# Patient Record
Sex: FEMALE | Race: WHITE | NOT HISPANIC OR LATINO | Employment: OTHER | URBAN - METROPOLITAN AREA
[De-identification: names, ages, dates, MRNs, and addresses within clinical notes are randomized per-mention and may not be internally consistent; named-entity substitution may affect disease eponyms.]

---

## 2017-02-27 ENCOUNTER — NON-PROVIDER VISIT (OUTPATIENT)
Dept: URGENT CARE | Facility: CLINIC | Age: 67
End: 2017-02-27

## 2017-02-27 DIAGNOSIS — Z02.89 ENCOUNTER FOR OCCUPATIONAL HEALTH EXAMINATION: ICD-10-CM

## 2017-02-27 DIAGNOSIS — Z02.1 PRE-EMPLOYMENT DRUG SCREENING: ICD-10-CM

## 2017-02-27 LAB
AMP AMPHETAMINE: NORMAL
COC COCAINE: NORMAL
INT CON NEG: NORMAL
INT CON POS: NORMAL
MET METHAMPHETAMINES: NORMAL
OPI OPIATES: NORMAL
PCP PHENCYCLIDINE: NORMAL
POC DRUG COMMENT 753798-OCCUPATIONAL HEALTH: NEGATIVE
THC: NORMAL

## 2017-02-27 PROCEDURE — 80305 DRUG TEST PRSMV DIR OPT OBS: CPT | Performed by: NURSE PRACTITIONER

## 2018-06-14 ENCOUNTER — HOSPITAL ENCOUNTER (EMERGENCY)
Facility: MEDICAL CENTER | Age: 68
End: 2018-06-14
Attending: EMERGENCY MEDICINE
Payer: MEDICARE

## 2018-06-14 ENCOUNTER — APPOINTMENT (OUTPATIENT)
Dept: RADIOLOGY | Facility: MEDICAL CENTER | Age: 68
End: 2018-06-14
Attending: EMERGENCY MEDICINE
Payer: MEDICARE

## 2018-06-14 VITALS
BODY MASS INDEX: 22.42 KG/M2 | HEIGHT: 60 IN | SYSTOLIC BLOOD PRESSURE: 128 MMHG | OXYGEN SATURATION: 97 % | TEMPERATURE: 98.1 F | HEART RATE: 76 BPM | DIASTOLIC BLOOD PRESSURE: 68 MMHG | WEIGHT: 114.2 LBS | RESPIRATION RATE: 18 BRPM

## 2018-06-14 DIAGNOSIS — S50.02XA CONTUSION OF LEFT ELBOW, INITIAL ENCOUNTER: ICD-10-CM
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
## 2018-06-14 DIAGNOSIS — M24.562 KNEE CONTRACTURE, LEFT: ICD-10-CM

## 2018-06-14 PROCEDURE — 73080 X-RAY EXAM OF ELBOW: CPT | Mod: LT

## 2018-06-14 PROCEDURE — A9270 NON-COVERED ITEM OR SERVICE: HCPCS | Performed by: EMERGENCY MEDICINE

## 2018-06-14 PROCEDURE — 73564 X-RAY EXAM KNEE 4 OR MORE: CPT | Mod: LT

## 2018-06-14 PROCEDURE — 99284 EMERGENCY DEPT VISIT MOD MDM: CPT

## 2018-06-14 PROCEDURE — 700102 HCHG RX REV CODE 250 W/ 637 OVERRIDE(OP): Performed by: EMERGENCY MEDICINE

## 2018-06-14 RX ORDER — IBUPROFEN 600 MG/1
600 TABLET ORAL ONCE
Status: COMPLETED | OUTPATIENT
Start: 2018-06-14 | End: 2018-06-14

## 2018-06-14 RX ORDER — MELOXICAM 7.5 MG/1
7.5 TABLET ORAL DAILY
Qty: 30 TAB | Refills: 0 | Status: ON HOLD | OUTPATIENT
Start: 2018-06-14 | End: 2020-07-17

## 2018-06-14 RX ADMIN — IBUPROFEN 600 MG: 600 TABLET, FILM COATED ORAL at 10:56
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
## 2018-06-14 NOTE — DISCHARGE INSTRUCTIONS
Contusion  A contusion is a deep bruise. Contusions are the result of a blunt injury to tissues and muscle fibers under the skin. The injury causes bleeding under the skin. The skin overlying the contusion may turn blue, purple, or yellow. Minor injuries will give you a painless contusion, but more severe contusions may stay painful and swollen for a few weeks.  What are the causes?  This condition is usually caused by a blow, trauma, or direct force to an area of the body.  What are the signs or symptoms?  Symptoms of this condition include:  · Swelling of the injured area.  · Pain and tenderness in the injured area.  · Discoloration. The area may have redness and then turn blue, purple, or yellow.  How is this diagnosed?  This condition is diagnosed based on a physical exam and medical history. An X-ray, CT scan, or MRI may be needed to determine if there are any associated injuries, such as broken bones (fractures).  How is this treated?  Specific treatment for this condition depends on what area of the body was injured. In general, the best treatment for a contusion is resting, icing, applying pressure to (compression), and elevating the injured area. This is often called the RICE strategy. Over-the-counter anti-inflammatory medicines may also be recommended for pain control.  Follow these instructions at home:  · Rest the injured area.  · If directed, apply ice to the injured area:  ¨ Put ice in a plastic bag.  ¨ Place a towel between your skin and the bag.  ¨ Leave the ice on for 20 minutes, 2-3 times per day.  · If directed, apply light compression to the injured area using an elastic bandage. Make sure the bandage is not wrapped too tightly. Remove and reapply the bandage as directed by your health care provider.  · If possible, raise (elevate) the injured area above the level of your heart while you are sitting or lying down.  · Take over-the-counter and prescription medicines only as told by your health  care provider.  Contact a health care provider if:  · Your symptoms do not improve after several days of treatment.  · Your symptoms get worse.  · You have difficulty moving the injured area.  Get help right away if:  · You have severe pain.  · You have numbness in a hand or foot.  · Your hand or foot turns pale or cold.  This information is not intended to replace advice given to you by your health care provider. Make sure you discuss any questions you have with your health care provider.  Document Released: 09/27/2006 Document Revised: 04/27/2017 Document Reviewed: 05/04/2016  [employer] Interactive Patient Education © 2017 [employer]  Motor Vehicle Collision Injury  It is common to have injuries to your face, arms, and body after a motor vehicle collision. These injuries may include cuts, burns, bruises, and sore muscles. These injuries tend to feel worse for the first 24-48 hours. You may have the most stiffness and soreness over the first several hours. You may also feel worse when you wake up the first morning after your collision. In the days that follow, you will usually begin to improve with each day. How quickly you improve often depends on the severity of the collision, the number of injuries you have, the location and nature of these injuries, and whether your airbag deployed.  Follow these instructions at home:  Medicines  · Take and apply over-the-counter and prescription medicines only as told by your health care provider.  · If you were prescribed antibiotic medicine, take or apply it as told by your health care provider. Do not stop using the antibiotic even if your condition improves.  If You Have a Wound or a Burn:  · Clean your wound or burn as told by your health care provider.  ¨ Wash the wound or burn with mild soap and water.  ¨ Rinse the wound or burn with water to remove all soap.  ¨ Pat the wound or burn dry with a clean towel. Do not rub it.  · Follow instructions from your health care  provider about how to take care of your wound or burn. Make sure you:  ¨ Know when and how to change your bandage (dressing). Always wash your hands with soap and water before you change your dressing. If soap and water are not available, use hand .  ¨ Leave stitches (sutures), skin glue, or adhesive strips in place, if this applies. These skin closures may need to stay in place for 2 weeks or longer. If adhesive strip edges start to loosen and curl up, you may trim the loose edges. Do not remove adhesive strips completely unless your health care provider tells you to do that.  ¨ Know when you should remove your dressing.  · Do not scratch or pick at the wound or burn.  · Do not break any blisters you may have. Do not peel any skin.  · Avoid exposing your burn or wound to the sun.  · Raise (elevate) the wound or burn above the level of your heart while you are sitting or lying down. If you have a wound or burn on your face, you may want to sleep with your head elevated. You may do this by putting an extra pillow under your head.  · Check your wound or burn every day for signs of infection. Watch for:  ¨ Redness, swelling, or pain.  ¨ Fluid, blood, or pus.  ¨ Warmth.  ¨ A bad smell.  General instructions  · Apply ice to your eyes, face, torso, or other injured areas as told by your health care provider. This can help with pain and swelling.  ¨ Put ice in a plastic bag.  ¨ Place a towel between your skin and the bag.  ¨ Leave the ice on for 20 minutes, 2-3 times a day.  · Drink enough fluid to keep your urine clear or pale yellow.  · Do not drink alcohol.  · Ask your health care provider if you have any lifting restrictions. Lifting can make neck or back pain worse, if this applies.  · Rest. Rest helps your body to heal. Make sure you:  ¨ Get plenty of sleep at night. Avoid staying up late at night.  ¨ Keep the same bedtime hours on weekends and weekdays.  · Ask your health care provider when you can drive,  ride a bicycle, or operate heavy machinery. Your ability to react may be slower if you injured your head. Do not do these activities if you are dizzy.  Contact a health care provider if:  · Your symptoms get worse.  · You have any of the following symptoms for more than two weeks after your motor vehicle collision:  ¨ Lasting (chronic) headaches.  ¨ Dizziness or balance problems.  ¨ Nausea.  ¨ Vision problems.  ¨ Increased sensitivity to noise or light.  ¨ Depression or mood swings.  ¨ Anxiety or irritability.  ¨ Memory problems.  ¨ Difficulty concentrating or paying attention.  ¨ Sleep problems.  ¨ Feeling tired all the time.  Get help right away if:  · You have:  ¨ Numbness, tingling, or weakness in your arms or legs.  ¨ Severe neck pain, especially tenderness in the middle of the back of your neck.  ¨ Changes in bowel or bladder control.  ¨ Increasing pain in any area of your body.  ¨ Shortness of breath or light-headedness.  ¨ Chest pain.  ¨ Blood in your urine, stool, or vomit.  ¨ Severe pain in your abdomen or your back.  ¨ Severe or worsening headaches.  ¨ Sudden vision loss or double vision.  · Your eye suddenly becomes red.  · Your pupil is an odd shape or size.  This information is not intended to replace advice given to you by your health care provider. Make sure you discuss any questions you have with your health care provider.  Document Released: 12/18/2006 Document Revised: 05/22/2017 Document Reviewed: 07/01/2016  [employer] Interactive Patient Education © 2017 [employer]

## 2019-05-15 ENCOUNTER — PHYSICAL THERAPY (OUTPATIENT)
Dept: PHYSICAL THERAPY | Facility: REHABILITATION | Age: 69
End: 2019-05-15
Attending: NURSE PRACTITIONER
Payer: MEDICARE

## 2019-05-15 ENCOUNTER — TELEPHONE (OUTPATIENT)
Dept: PHYSICAL THERAPY | Facility: REHABILITATION | Age: 69
End: 2019-05-15

## 2019-05-15 DIAGNOSIS — M25.552 LEFT HIP PAIN: ICD-10-CM

## 2019-05-15 DIAGNOSIS — M25.562 LEFT KNEE PAIN, UNSPECIFIED CHRONICITY: ICD-10-CM

## 2019-05-15 PROCEDURE — 97110 THERAPEUTIC EXERCISES: CPT

## 2019-05-15 PROCEDURE — 97162 PT EVAL MOD COMPLEX 30 MIN: CPT

## 2019-05-15 PROCEDURE — 97014 ELECTRIC STIMULATION THERAPY: CPT

## 2019-05-15 ASSESSMENT — ENCOUNTER SYMPTOMS
PAIN SCALE AT LOWEST: 2
PAIN SCALE AT HIGHEST: 8
PAIN SCALE: 3

## 2019-05-15 NOTE — OP THERAPY DISCHARGE SUMMARY
I saw Arnaldo today for an evaluation for left knee pain.  Patient presented WNL for L knee rom.  (+) L hip scour test with severe pain with L hip flexion at 90deg.      ---recommend x-ray L HIP with suspiscion of significant OA

## 2019-05-15 NOTE — OP THERAPY EVALUATION
Outpatient Physical Therapy  INITIAL EVALUATION    [facility] Physical Therapy [place] [address] [address]  [address]  [place] [address]  Phone:  [phone]  Fax:  [phone]    Date of Evaluation: 05/15/2019    Patient: Generosa Apalla Chua  YOB: 1950  MRN: [id number]     Referring Provider: LITO Cano  [address]   [address]  [place] [address]   Referring Diagnosis Pain in left knee [M25.562]     Time Calculation  Start time: 820  Stop time: 915 Time Calculation (min): 55 minutes     Physical Therapy Occurrence Codes    Date of onset of impairment:  18   Date physical therapy care plan established or reviewed:  5/15/19   Date physical therapy treatment started:  5/15/19          Chief Complaint: Difficulty Walking and Leg Pain    Visit Diagnoses     ICD-10-CM   1. Left knee pain, unspecified chronicity M25.562   2. Left hip pain M25.552         Subjective   History of Present Illness:     History of chief complaint:  Patient reports L hip, thigh and knee pain with occasional n/t into the bottom of her left foot    Prior level of function:  Retired//none walk     Pain:     Current pain rating:  3    At best pain ratin    At worst pain ratin    Location:  L hip, thigh and knee and ocasional  n/t plantar surface L foot    Aggravating factors:  Walk more than 20'  vaccuming increases pain  Bend over with severe pain upon standing  WOMAC< 35%        Past Medical History:   Diagnosis Date   • Hyperlipidemia    • Insomnia      History reviewed. No pertinent surgical history.    Precautions:       Objective   Observation and functional movement:  Significant antalgia and g-medius gait pattern with L mid-stance    Range of motion and strength:    Strength is within functional limits.    L knee wnl  L hip fle: 90--pain  Ir 20: pain  ER 30 : pain  R hip wfl with hip fle > 110 no pain    Sensation and reflexes:       Reflexes are normal and symmetrical.    Palpation and joint  "mobility:     TTP adductors and g-min            Therapeutic Treatments and Modalities:     Therapeutic Treatment and Modalities Summary: Bridge marching/bridges x 1'// less pain with walking  Toy soldier #1 x 20//less pain with walking  Gait trg. With spc w/ decreased antalgia  Ukrainian 5/5/\" adductor group and g min x 15 mhp    //no pain    Time-based treatments/modalities:  Therapeutic exercise minutes (CPT 75557): 15 minutes       Assessment, Response and Plan:   Assessment details:  Patient present with L hip  capsular pattern limitation and pain which appears to be progenitor for L thigh and knee pain as well.  Patient presented WNL for L knee rom.  (+) L hip scour test with severe pain with L hip flexion at 90deg.  Patient reported decreased pain with ambulation after treatment and while walking with a spc.    ### recommend x-ray L HIP with suspiscion of significant OA  Prognosis: fair    Goals:   Short Term Goals:   Walk more than 20' w/o increased pain  Vacuum > 10' w/o increased pain  Bend over w/o pain upon standing  WOMAC< 35%  Short term goal time span:  2-4 weeks      Long Term Goals:    Walk more than 30' w/o increased pain  Vacuum > 20' w/o increased pain  10 steps w/o increased pain  WOMAC< 15%  Long term goal time span:  6-8 weeks    Plan:   Therapy options:  Physical therapy treatment to continue  Planned therapy interventions:  Therapeutic Exercise (CPT 77497), Gait Training (CPT 43853), E Stim Unattended (CPT 47251) and Manual Therapy (CPT 68209)  Frequency:  2x week  Duration in weeks:  8  Duration in visits:  10  Plan details:  Post chain strength, running man, rocking, jt mobs, IASTM, e-stim      Functional Limitations and Severity Modifiers  PT Functional Assessment Tool Used: womac  PT Functional Assessment Score: 55%   Current:     Goal:       Referring provider co-signature:  I have reviewed this plan of care and my co-signature certifies the need for services.  Certification Dates:   From " 5/15/19   To 7/19/19    ### recommend x-ray L HIP with suspiscion of significant OA###    Physician Signature: ________________________________ Date: ______________

## 2019-05-22 ENCOUNTER — PHYSICAL THERAPY (OUTPATIENT)
Dept: PHYSICAL THERAPY | Facility: REHABILITATION | Age: 69
End: 2019-05-22
Attending: NURSE PRACTITIONER
Payer: MEDICARE

## 2019-05-22 DIAGNOSIS — M25.562 LEFT KNEE PAIN, UNSPECIFIED CHRONICITY: ICD-10-CM

## 2019-05-22 PROCEDURE — 97110 THERAPEUTIC EXERCISES: CPT

## 2019-05-22 PROCEDURE — 97014 ELECTRIC STIMULATION THERAPY: CPT

## 2019-05-22 NOTE — OP THERAPY DAILY TREATMENT
"  Outpatient Physical Therapy  DAILY TREATMENT     [facility] Physical Therapy [address].  [address]  [place] [address]  Phone:  [phone]  Fax:  [phone]    Date: 05/22/2019    Patient: Generosa Apalla Chua  YOB: 1950  MRN: [id number]     Time Calculation  Start time: 0218  Stop time: 0215 Time Calculation (min): 1437 minutes     Chief Complaint: No chief complaint on file.    Visit #: 2 temporary relief with exercise...nost pain with standing up after long duration sitting.  Trying to walk with cane all fo the time    OBJECTIVE:  IR: 25--increase knee pain--decreased pain with belt distraction           Therapeutic Treatments and Modalities:     Therapeutic Treatment and Modalities Summary: Ball bridges 2x 1'//;less pain with walking but pain returned\" a little\" after 200 ft w/o spc  Bridge marching x 20--hep  S/l running man x 20-hep  Prone L hip ext x 10-hep  Belt mobs   Gait trg w/ arm swing--adjusted spc  Russian to  g med/min/add 5/5 w/ mhp x 15'    Time-based treatments/modalities:  Therapeutic exercise minutes (CPT 22424): 25 minutes       Pain rating before treatment: 3  Pain rating after treatment: 0 no pain feel good    ASSESSMENT:   Cotn. To present with capsule pattern limit in l hip w/ noted antalgia due to decreased t-plane motion--abolished pain after rx.  PLAN/RECOMMENDATIONS:         "

## 2019-06-04 ENCOUNTER — PHYSICAL THERAPY (OUTPATIENT)
Dept: PHYSICAL THERAPY | Facility: REHABILITATION | Age: 69
End: 2019-06-04
Attending: NURSE PRACTITIONER
Payer: MEDICARE

## 2019-06-04 DIAGNOSIS — M25.562 LEFT KNEE PAIN, UNSPECIFIED CHRONICITY: ICD-10-CM

## 2019-06-04 DIAGNOSIS — M25.552 LEFT HIP PAIN: ICD-10-CM

## 2019-06-04 PROCEDURE — 97014 ELECTRIC STIMULATION THERAPY: CPT

## 2019-06-04 PROCEDURE — 97110 THERAPEUTIC EXERCISES: CPT

## 2019-06-04 NOTE — OP THERAPY DAILY TREATMENT
"  Outpatient Physical Therapy  DAILY TREATMENT     [facility] Physical Therapy [address]  [address]  [place] [address]  Phone:  [phone]  Fax:  [phone]    Date: 06/04/2019    Patient: Generosa Apalla Chua  YOB: 1950  MRN: [id number]     Time Calculation  Start time: 0817  Stop time: 0904 Time Calculation (min): 47 minutes     Chief Complaint: No chief complaint on file.    Visit #: 3   Overall, less pain but still limited with walking more than sitting    OBJECTIVE:  IR: 25--jose than before hip [id number]\"little pain          Therapeutic Treatments and Modalities:     Therapeutic Treatment and Modalities Summary: Ball bridges 2x '  Ball bridges with ball roll in/out--hep  Bridge marching x 20--reviewed  S/l running man x 20-reviewed  Belt mobs //no pain  Gait trg with t-plane assist from PT//no pain with walking > 300 ft w/o a/d  Toy soldiers #0  Russian to  g med/min/add 5/5 w/ mhp x 15'    Time-based treatments/modalities:  Therapeutic exercise minutes (CPT 57854): 26 minutes       Pain rating before treatment: 2  Pain rating after treatment: 0 no pain feel good    ASSESSMENT:   Increased L hip motion with decreased pain and no pain wioth walking--cont. Weak L g-midius/minimus  PLAN/RECOMMENDATIONS:   Progress post chain strength, mobs, toy soldiers        "

## 2019-06-06 ENCOUNTER — PHYSICAL THERAPY (OUTPATIENT)
Dept: PHYSICAL THERAPY | Facility: REHABILITATION | Age: 69
End: 2019-06-06
Attending: NURSE PRACTITIONER
Payer: MEDICARE

## 2019-06-06 DIAGNOSIS — M25.552 LEFT HIP PAIN: ICD-10-CM

## 2019-06-06 PROCEDURE — 97110 THERAPEUTIC EXERCISES: CPT

## 2019-06-06 PROCEDURE — 97014 ELECTRIC STIMULATION THERAPY: CPT

## 2019-06-06 NOTE — OP THERAPY DAILY TREATMENT
Outpatient Physical Therapy  DAILY TREATMENT     [facility] Physical Therapy [address].  [address]  [place] [address]  Phone:  [phone]  Fax:  [phone]    Date: 06/06/2019    Patient: Generosa Apalla Chua  YOB: 1950  MRN: [id number]     Time Calculation  Start time: 0816  Stop time: 0900 Time Calculation (min): 44 minutes     Chief Complaint: No chief complaint on file.    Visit #: 4   Some pain with ex at home but not when she does them here    OBJECTIVE:  IR: 25--jose than before hip fle:110-no pain          Therapeutic Treatments and Modalities:     Therapeutic Treatment and Modalities Summary: Ball bridges 2x '  Ball bridges with ball roll in/out--hep  Bridge marching x 20--reviewed  S/l running man x 20-reviewed  Rocking x 20--hep  Prone L hip IR--h/o hep  Gait trg with t-plane assist from PT//struggles with reciprocal arm swing    Russian to  g med/min/add 5/5 w/ mhp x 15'    Time-based treatments/modalities:  Gait training minutes (CPT 39890): 5 minutes  Therapeutic exercise minutes (CPT 38877): 25 minutes       Pain rating before treatment: 2  Pain rating after treatment: 0 no pain feel good    ASSESSMENT:   Reviewed protocol and form for HEP w/ patient requiring instuction for correct form--tolerates exercise w/o increased pain.  continues to present with capsule pattern limits but progressing slowly  PLAN/RECOMMENDATIONS:   Progress post chain strength, mobs, toy soldiers

## 2019-06-11 ENCOUNTER — PHYSICAL THERAPY (OUTPATIENT)
Dept: PHYSICAL THERAPY | Facility: REHABILITATION | Age: 69
End: 2019-06-11
Attending: NURSE PRACTITIONER
Payer: MEDICARE

## 2019-06-11 DIAGNOSIS — M25.552 LEFT HIP PAIN: ICD-10-CM

## 2019-06-11 PROCEDURE — 97110 THERAPEUTIC EXERCISES: CPT

## 2019-06-11 PROCEDURE — 97014 ELECTRIC STIMULATION THERAPY: CPT

## 2019-06-11 NOTE — OP THERAPY DAILY TREATMENT
"  Outpatient Physical Therapy  DAILY TREATMENT     [facility] Physical Therapy [address].  [address]  [place] [address]  Phone:  [phone]  Fax:  [phone]    Date: 06/11/2019    Patient: Generosa Apalla Chua  YOB: 1950  MRN: [id number]     Time Calculation  Start time: 0815  Stop time: 0900 Time Calculation (min): 45 minutes     Chief Complaint: No chief complaint on file.    Visit #: 5   Less pain but knee feels heavy    OBJECTIVE:  IR: 25 erp hip fle:110-no pain          Therapeutic Treatments and Modalities:     Therapeutic Treatment and Modalities Summary: Rocking//no pain  Gait trg //less limp with improved stride length  balNCE AND bilateral wt shift on black bosu x 6'//tied , no pain\"  Ball bridges w/ alternating leg lift 2x 10  Ball bridges with ball roll in/out--hep  Bridge marching x 20--reviewed  S/l running man x 20-reviewed  Rocking x 20--hep  End rnge lateral jt mobs gd3-4  Belarusian to  g med/min/add 5/5 w/ mhp x 15'    Time-based treatments/modalities:  Therapeutic exercise minutes (CPT 24065): 25 minutes       Pain rating before treatment: 2  Pain rating after treatment: 0 no pain   0-115--no erp IT 25 erp  ASSESSMENT:   Continues to increase walking toleranc>Re with increased flexion rom--noted posterior chain weekness L   PLAN/RECOMMENDATIONS:   Progress post chain strength, mobs, toy soldiers        "

## 2019-06-13 ENCOUNTER — PHYSICAL THERAPY (OUTPATIENT)
Dept: PHYSICAL THERAPY | Facility: REHABILITATION | Age: 69
End: 2019-06-13
Attending: NURSE PRACTITIONER
Payer: MEDICARE

## 2019-06-13 DIAGNOSIS — M25.552 LEFT HIP PAIN: ICD-10-CM

## 2019-06-13 PROCEDURE — 97110 THERAPEUTIC EXERCISES: CPT

## 2019-06-13 PROCEDURE — 97014 ELECTRIC STIMULATION THERAPY: CPT

## 2019-06-13 NOTE — OP THERAPY DAILY TREATMENT
"  Outpatient Physical Therapy  DAILY TREATMENT     [facility] Physical Therapy [address].  [address]  [place] [address]  Phone:  [phone]  Fax:  [phone]    Date: 06/13/2019    Patient: Generosa Apalla Chua  YOB: 1950  MRN: [id number]     Time Calculation  Start time: 0815  Stop time: 0902 Time Calculation (min): 47 minutes     Chief Complaint: No chief complaint on file.    Visit #: 6   A little pain but not bad    OBJECTIVE:  IR: 25 erp hip fle:110-no pain          Therapeutic Treatments and Modalities:     Therapeutic Treatment and Modalities Summary: Rocking/ 2/10 with walking  Gait trg //less limp with improved stride length  balNCE AND bilateral wt shift on black bosu x 6'//tied , no pain\"  Ball bridges w/ alternating leg lift 2x 10  Ball bridges with ball roll in/out--hep  S/l running man x 20-reviewed  End rnge lateral jt mobs gd3-4  Armenian to  g med/min/add 5/5 w/ mhp x 15'    Time-based treatments/modalities:  Therapeutic exercise minutes (CPT 41661): 25 minutes       Pain rating before treatment: 4/10--no pain meds today  Pain rating after treatment: 0 no pain   0-115--no erp IT 25 erp  ASSESSMENT:   Continues to increase walking tolerance and able to abolish pain with exercise.  However, cont. To present with capsule pattern limits due to hip OA--recommend f/u with orthopedis  PLAN/RECOMMENDATIONS:   Progress post chain strength, mobs, toy soldiers--d/c 1-2 visits        "

## 2019-06-18 ENCOUNTER — PHYSICAL THERAPY (OUTPATIENT)
Dept: PHYSICAL THERAPY | Facility: REHABILITATION | Age: 69
End: 2019-06-18
Attending: NURSE PRACTITIONER
Payer: MEDICARE

## 2019-06-18 DIAGNOSIS — M25.552 LEFT HIP PAIN: ICD-10-CM

## 2019-06-18 PROCEDURE — 97014 ELECTRIC STIMULATION THERAPY: CPT

## 2019-06-18 PROCEDURE — 97110 THERAPEUTIC EXERCISES: CPT

## 2019-06-18 NOTE — OP THERAPY DAILY TREATMENT
"  Outpatient Physical Therapy  DAILY TREATMENT     [facility] [address].  [address]  [place] [address]  Phone:  [phone]  Fax:  [phone]    Date: 06/18/2019    Patient: Generosa Apalla Chua  YOB: 1950  MRN: [id number]     Time Calculation  Start time: 0819  Stop time: 0902 Time Calculation (min): 43 minutes     Chief Complaint: No chief complaint on file.    Visit #: 7   Feels good after exercise but pain always comes back and is \"really bad\" after lots of standing...patient reported severe pain after helping people set up for a party    OBJECTIVE:  IR: 25 erp hip fle:110-no pain  115--erp  ER: 30--pain deep gluts          Therapeutic Treatments and Modalities:     Therapeutic Treatment and Modalities Summary: Nu step #2 x 10'  Rocking/ 2/10 with walking  balNCE AND bilateral wt shift on black bosu x 6'//tied , no pain\"  Ball bridges w/ alternating leg lift 2x 10  Ball bridges with ball roll in/out--hep  instructed L ER stretches in supine--hep  Russian to  g med/min/add 5/5 w/ mhp x 15'  ##instructed patient to minimize walking and increase bike/or water exercises     Time-based treatments/modalities:  Therapeutic exercise minutes (CPT 56989): 23 minutes       Pain rating before treatment: 2/10--  Pain rating after treatment: 0 no pain   0-115--no erp IT 25 erp  ASSESSMENT:   Overall less pain but continues to report severe limits with standing or walking activities--patient is reaching a benefit plateau with exercise and is independent with her HEP--continued limit with ER/IR  PLAN/RECOMMENDATIONS:   Progress post chain strength, mobs, toy soldiers--d/c 1 visits  Recommend f/u with ortho        "

## 2019-06-20 ENCOUNTER — PHYSICAL THERAPY (OUTPATIENT)
Dept: PHYSICAL THERAPY | Facility: REHABILITATION | Age: 69
End: 2019-06-20
Attending: NURSE PRACTITIONER
Payer: MEDICARE

## 2019-06-20 DIAGNOSIS — M25.552 LEFT HIP PAIN: ICD-10-CM

## 2019-06-20 PROCEDURE — 97110 THERAPEUTIC EXERCISES: CPT

## 2019-06-20 PROCEDURE — 97014 ELECTRIC STIMULATION THERAPY: CPT

## 2019-06-20 PROCEDURE — 97140 MANUAL THERAPY 1/> REGIONS: CPT

## 2019-06-20 NOTE — OP THERAPY DAILY TREATMENT
"  Outpatient Physical Therapy  DAILY TREATMENT     [facility] [address]  [address]  [place] [address]  Phone:  [phone]  Fax:  [phone]    Date: 06/20/2019    Patient: Generosa Apalla Chua  YOB: 1950  MRN: [id number]     Time Calculation  Start time: 0815  Stop time: 0903 Time Calculation (min): 48 minutes     Chief Complaint: No chief complaint on file.    Visit #: 8   \"only temporary relief with my leg sore if I walk too much and I can't do stairs without pain\"    OBJECTIVE:  IR: 15 erp hip fle:100- pain  ER: 30--pain deep gluts          Therapeutic Treatments and Modalities:     Therapeutic Treatment and Modalities Summary: Nu step #2 x 10'  Rocking--reviewed  Belt mobs to L hip gd 3-4  At end ranges// less pain  Ball bridges w/ alternating leg lift 2x 10  Ball bridges with ball roll in/out--hep   L ER stretches in supine--hep--reviewed  Russian to  g med/min/add 5/5 w/ mhp x 15'  Stationary bike x 5'// feels better  Reviewed importance of continuing exercise and try riding a bike daily    Time-based treatments/modalities:  Manual therapy minutes (CPT 75202): 8 minutes  Therapeutic exercise minutes (CPT 69185): 15 minutes     WOMAC 54% disability  Pain rating before treatment: 4/10--  Pain rating after treatment: 1 better\"   0-115--IR 15 erp  ASSESSMENT:   Patient has reached a plateau with decreased pain after treatment but continued limits with walking and stairs  PLAN/RECOMMENDATIONS:   d/c from therapy at this time with recommendation to f/u with ortho        "

## 2019-06-20 NOTE — OP THERAPY DISCHARGE SUMMARY
"  Outpatient Physical Therapy  DISCHARGE SUMMARY NOTE      [facility]  [address]  [address]  [place] [address]  Phone:  [phone]  Fax:  [phone]    Date of Visit: 06/20/2019    Patient: Generosa Apalla Chua  YOB: 1950  MRN: [id number]     Referring Provider: LITO Cano  [address]   [address] [address]   Referring Diagnosis Pain in left knee [M25.562]     Physical Therapy Occurrence Codes    Date of onset of impairment:  5/20/18   Date physical therapy care plan established or reviewed:  5/15/19   Date physical therapy treatment started:  5/15/19          Functional Limitations and Severity Modifiers      Goal:     Discharge:  WOMAC 54% disability       Your patient is being discharged from Physical Therapy with the following comments:   · Progress plateau    \"only temporary relief with my leg sore if I walk too much and I can't do stairs without pain\"     OBJECTIVE:  IR: 15 erp   hip fle:100-105  pain  ER: 30--pain deep gluts       0-115--IR 15 erp  ASSESSMENT:   Patient has reached a plateau with decreased pain after treatment but continued limits with walking and stairs.  Suspect L hip OA changes limiting benefit of conservative treatment  PLAN/RECOMMENDATIONS:   d/c from therapy at this time with recommendation to f/u with ortho       Tristan Noriega, PT, DPT, OCS    Date: 6/20/2019  "

## 2020-01-10 ENCOUNTER — HOSPITAL ENCOUNTER (OUTPATIENT)
Facility: MEDICAL CENTER | Age: 70
End: 2020-01-10
Attending: ORTHOPAEDIC SURGERY | Admitting: ORTHOPAEDIC SURGERY
Payer: MEDICARE

## 2020-03-02 ENCOUNTER — APPOINTMENT (OUTPATIENT)
Dept: OTHER | Facility: IMAGING CENTER | Age: 70
End: 2020-03-02
Payer: MEDICARE

## 2020-03-05 VITALS — BODY MASS INDEX: 20.78 KG/M2 | HEIGHT: 60 IN | WEIGHT: 105.82 LBS

## 2020-03-05 DIAGNOSIS — Z01.812 PRE-OPERATIVE LABORATORY EXAMINATION: ICD-10-CM

## 2020-03-05 DIAGNOSIS — Z01.810 PRE-OPERATIVE CARDIOVASCULAR EXAMINATION: ICD-10-CM

## 2020-03-05 LAB
ANION GAP SERPL CALC-SCNC: 9 MMOL/L (ref 0–11.9)
BUN SERPL-MCNC: 10 MG/DL (ref 8–22)
CALCIUM SERPL-MCNC: 9.3 MG/DL (ref 8.5–10.5)
CHLORIDE SERPL-SCNC: 104 MMOL/L (ref 96–112)
CO2 SERPL-SCNC: 28 MMOL/L (ref 20–33)
CREAT SERPL-MCNC: 0.73 MG/DL (ref 0.5–1.4)
ERYTHROCYTE [DISTWIDTH] IN BLOOD BY AUTOMATED COUNT: 49 FL (ref 35.9–50)
GLUCOSE SERPL-MCNC: 112 MG/DL (ref 65–99)
HCT VFR BLD AUTO: 40.9 % (ref 37–47)
HGB BLD-MCNC: 13.1 G/DL (ref 12–16)
MCH RBC QN AUTO: 30.8 PG (ref 27–33)
MCHC RBC AUTO-ENTMCNC: 32 G/DL (ref 33.6–35)
MCV RBC AUTO: 96 FL (ref 81.4–97.8)
PLATELET # BLD AUTO: 275 K/UL (ref 164–446)
PMV BLD AUTO: 9.8 FL (ref 9–12.9)
POTASSIUM SERPL-SCNC: 4 MMOL/L (ref 3.6–5.5)
RBC # BLD AUTO: 4.26 M/UL (ref 4.2–5.4)
SODIUM SERPL-SCNC: 141 MMOL/L (ref 135–145)
WBC # BLD AUTO: 5.5 K/UL (ref 4.8–10.8)

## 2020-03-05 PROCEDURE — 87640 STAPH A DNA AMP PROBE: CPT | Mod: XU

## 2020-03-05 PROCEDURE — 36415 COLL VENOUS BLD VENIPUNCTURE: CPT

## 2020-03-05 PROCEDURE — 87641 MR-STAPH DNA AMP PROBE: CPT

## 2020-03-05 PROCEDURE — 80048 BASIC METABOLIC PNL TOTAL CA: CPT

## 2020-03-05 PROCEDURE — 85027 COMPLETE CBC AUTOMATED: CPT

## 2020-03-05 RX ORDER — QUETIAPINE FUMARATE 25 MG/1
1 TABLET, FILM COATED ORAL
COMMUNITY
Start: 2019-12-29

## 2020-03-05 RX ORDER — ALENDRONATE SODIUM 70 MG/1
1 TABLET ORAL
COMMUNITY
Start: 2019-12-29 | End: 2020-03-05

## 2020-03-05 NOTE — OR NURSING
Hx and meds reviewed, pre op instructions given.  Anesthesia fasting guidelines reviewed with pt.Pt has hx of falls and uses a cane or walker, placed on fall risk precautions.    TOTAL JOINT REPLACEMENT SURGERY   PreAdmit Appointment  Pre-Operative Education Note       1) Did you take a Total Joint Replacement Pre-Operative Education class?  Where?  Answer: Yes    2) If you did not take a class, did you receive pre-op education in the form of a book or through an online class?    Answer: Yes    3) Have you had the same joint replacement procedure within the last 3 years?   Answer:No

## 2020-03-05 NOTE — DISCHARGE PLANNING
DISCHARGE PLANNING NOTE - TOTAL JOINT     Procedure: Procedure(s):  ARTHROPLASTY, HIP, TOTAL  Procedure Date: 3/18/2020  Insurance:  Payor: MEDICARE / Plan: MEDICARE PART A & B / Product Type: *No Product type* /   Equipment currently available at home? front-wheel walker, raised toilet seat, shower chair and reacher.  Steps into the home? 14  Steps within the home? 0  Toilet height? Standard  Type of shower? tub-shower  Who will be with you during your recovery? No one. Pt's nephew and his wife might be able to help once in awhile.  Is Outpatient Physical Therapy set up after surgery? No   Did you take the Total Joint Class and where? Yes, at [facility], 3/2/20.   Met with patient during their pre-admission appointment. Pt states she lives at [facility] on the second floor, no elevator. Pt states she does not have any friends or family who can help her at home after surgery and is asking for information on nursing facilities. Choice for snf obtained from pt and scanned into media. Phone message left for KEMI Bella for Dr. Childress, to request IP orders in anticipation of snf after surgery.   Provided Home Safety Checklist to pt. Reviewed Equipment Resouce list and provided a copy to the patient.     Plan: Continue to communicate with Dr. Griggs' office for admission status.

## 2020-03-06 LAB
SCCMEC + MECA PNL NOSE NAA+PROBE: NEGATIVE
SCCMEC + MECA PNL NOSE NAA+PROBE: POSITIVE

## 2020-03-06 NOTE — DISCHARGE PLANNING
Jena, Dr. Griggs' MA, phoned back to discuss dc plan after surgery. Jena notified of what is now needed from her office/MD, and states dc plan to snf is acceptable to Dr. Camacho.

## 2020-07-01 ENCOUNTER — APPOINTMENT (OUTPATIENT)
Dept: ADMISSIONS | Facility: MEDICAL CENTER | Age: 70
End: 2020-07-01
Payer: MEDICARE

## 2020-07-01 DIAGNOSIS — Z01.810 PRE-OPERATIVE CARDIOVASCULAR EXAMINATION: ICD-10-CM

## 2020-07-01 DIAGNOSIS — Z01.812 PRE-OPERATIVE LABORATORY EXAMINATION: ICD-10-CM

## 2020-07-01 LAB
ANION GAP SERPL CALC-SCNC: 10 MMOL/L (ref 7–16)
BUN SERPL-MCNC: 17 MG/DL (ref 8–22)
CALCIUM SERPL-MCNC: 9.2 MG/DL (ref 8.4–10.2)
CHLORIDE SERPL-SCNC: 104 MMOL/L (ref 96–112)
CO2 SERPL-SCNC: 25 MMOL/L (ref 20–33)
CREAT SERPL-MCNC: 0.54 MG/DL (ref 0.5–1.4)
ERYTHROCYTE [DISTWIDTH] IN BLOOD BY AUTOMATED COUNT: 45.6 FL (ref 35.9–50)
GLUCOSE SERPL-MCNC: 108 MG/DL (ref 65–99)
HCT VFR BLD AUTO: 39.9 % (ref 37–47)
HGB BLD-MCNC: 13 G/DL (ref 12–16)
MCH RBC QN AUTO: 30.3 PG (ref 27–33)
MCHC RBC AUTO-ENTMCNC: 32.6 G/DL (ref 33.6–35)
MCV RBC AUTO: 93 FL (ref 81.4–97.8)
PLATELET # BLD AUTO: 258 K/UL (ref 164–446)
PMV BLD AUTO: 9.2 FL (ref 9–12.9)
POTASSIUM SERPL-SCNC: 4.1 MMOL/L (ref 3.6–5.5)
RBC # BLD AUTO: 4.29 M/UL (ref 4.2–5.4)
SCCMEC + MECA PNL NOSE NAA+PROBE: NEGATIVE
SCCMEC + MECA PNL NOSE NAA+PROBE: NEGATIVE
SODIUM SERPL-SCNC: 139 MMOL/L (ref 135–145)
WBC # BLD AUTO: 5.1 K/UL (ref 4.8–10.8)

## 2020-07-01 PROCEDURE — 87640 STAPH A DNA AMP PROBE: CPT | Mod: XU

## 2020-07-01 PROCEDURE — 36415 COLL VENOUS BLD VENIPUNCTURE: CPT

## 2020-07-01 PROCEDURE — 85027 COMPLETE CBC AUTOMATED: CPT

## 2020-07-01 PROCEDURE — 80048 BASIC METABOLIC PNL TOTAL CA: CPT

## 2020-07-01 PROCEDURE — 87641 MR-STAPH DNA AMP PROBE: CPT

## 2020-07-01 NOTE — OR NURSING
Hx and meds reviewed, pre op instructions given.  Anesthesia fasting guidelines reviewed with pt.     TOTAL JOINT REPLACEMENT SURGERY   PreAdmit Appointment  Pre-Operative Education Note       1) Did you take a Total Joint Replacement Pre-Operative Education class?  Where?  Answer: Yes    2) If you did not take a class, did you receive pre-op education in the form of a book or through an online class?    Answer: Yes    3) Have you had the same joint replacement procedure within the last 3 years?   Answer: No      Additional Question:  Is patient planning for a same-day discharge? Not sure, needs after care, has no one at home. Spoke with Xochilt TEIXEIRA case manager

## 2020-07-01 NOTE — DISCHARGE PLANNING
DISCHARGE PLANNING NOTE - TOTAL JOINT     Procedure: Procedure(s):  ARTHROPLASTY, HIP, TOTAL  Procedure Date: 7/15/2020  Insurance:  Payor: MEDICARE / Plan: MEDICARE PART A & B / Product Type: *No Product type* /   Equipment currently available at home? Cane.  Steps into the home? 2 flights.  Steps within the home? 0  Toilet height? Standard  Type of shower? tub-shower  Who will be with you during your recovery? Transitional care facility.  Is Outpatient Physical Therapy set up after surgery? No   Did you take the Total Joint Class and where? Yes, on-line.  Planning on same day discharge? No.     This writer met with pt during her preadmission appointment. Pt and MD plan on overnight stay due to need for transitional care post op before going home. Choice for inpatient rehab and Choice for snf obtained from pt after explaining process to her. Pt verbalizes understanding. Choice forms scanned in to Epic. Anticipate dc to transitional care facility.

## 2020-07-10 ENCOUNTER — OFFICE VISIT (OUTPATIENT)
Dept: ADMISSIONS | Facility: MEDICAL CENTER | Age: 70
End: 2020-07-10
Attending: ORTHOPAEDIC SURGERY
Payer: MEDICARE

## 2020-07-10 DIAGNOSIS — Z01.812 PRE-OPERATIVE LABORATORY EXAMINATION: ICD-10-CM

## 2020-07-10 LAB — COVID ORDER STATUS COVID19: NORMAL

## 2020-07-11 LAB
SARS-COV-2 RNA RESP QL NAA+PROBE: NOTDETECTED
SPECIMEN SOURCE: NORMAL

## 2020-07-15 ENCOUNTER — HOSPITAL ENCOUNTER (OUTPATIENT)
Facility: MEDICAL CENTER | Age: 70
End: 2020-07-17
Attending: ORTHOPAEDIC SURGERY | Admitting: ORTHOPAEDIC SURGERY
Payer: MEDICARE

## 2020-07-15 ENCOUNTER — ANESTHESIA EVENT (OUTPATIENT)
Dept: SURGERY | Facility: MEDICAL CENTER | Age: 70
End: 2020-07-15
Payer: MEDICARE

## 2020-07-15 ENCOUNTER — APPOINTMENT (OUTPATIENT)
Dept: RADIOLOGY | Facility: MEDICAL CENTER | Age: 70
End: 2020-07-15
Attending: ORTHOPAEDIC SURGERY
Payer: MEDICARE

## 2020-07-15 ENCOUNTER — ANESTHESIA (OUTPATIENT)
Dept: SURGERY | Facility: MEDICAL CENTER | Age: 70
End: 2020-07-15
Payer: MEDICARE

## 2020-07-15 DIAGNOSIS — M16.10 HIP ARTHRITIS: ICD-10-CM

## 2020-07-15 PROCEDURE — 700101 HCHG RX REV CODE 250: Performed by: ORTHOPAEDIC SURGERY

## 2020-07-15 PROCEDURE — 700111 HCHG RX REV CODE 636 W/ 250 OVERRIDE (IP): Performed by: ANESTHESIOLOGY

## 2020-07-15 PROCEDURE — 700105 HCHG RX REV CODE 258: Performed by: ORTHOPAEDIC SURGERY

## 2020-07-15 PROCEDURE — A9270 NON-COVERED ITEM OR SERVICE: HCPCS | Performed by: ANESTHESIOLOGY

## 2020-07-15 PROCEDURE — 502240 HCHG MISC OR SUPPLY RC 0272: Performed by: ORTHOPAEDIC SURGERY

## 2020-07-15 PROCEDURE — 94760 N-INVAS EAR/PLS OXIMETRY 1: CPT

## 2020-07-15 PROCEDURE — A9270 NON-COVERED ITEM OR SERVICE: HCPCS | Performed by: ORTHOPAEDIC SURGERY

## 2020-07-15 PROCEDURE — 160048 HCHG OR STATISTICAL LEVEL 1-5: Performed by: ORTHOPAEDIC SURGERY

## 2020-07-15 PROCEDURE — C1713 ANCHOR/SCREW BN/BN,TIS/BN: HCPCS | Performed by: ORTHOPAEDIC SURGERY

## 2020-07-15 PROCEDURE — 700112 HCHG RX REV CODE 229: Performed by: ORTHOPAEDIC SURGERY

## 2020-07-15 PROCEDURE — 160031 HCHG SURGERY MINUTES - 1ST 30 MINS LEVEL 5: Performed by: ORTHOPAEDIC SURGERY

## 2020-07-15 PROCEDURE — G0378 HOSPITAL OBSERVATION PER HR: HCPCS

## 2020-07-15 PROCEDURE — 160036 HCHG PACU - EA ADDL 30 MINS PHASE I: Performed by: ORTHOPAEDIC SURGERY

## 2020-07-15 PROCEDURE — 72170 X-RAY EXAM OF PELVIS: CPT

## 2020-07-15 PROCEDURE — 700101 HCHG RX REV CODE 250: Performed by: ANESTHESIOLOGY

## 2020-07-15 PROCEDURE — 96365 THER/PROPH/DIAG IV INF INIT: CPT | Mod: XU

## 2020-07-15 PROCEDURE — 501445 HCHG STAPLER, SKIN DISP: Performed by: ORTHOPAEDIC SURGERY

## 2020-07-15 PROCEDURE — 160009 HCHG ANES TIME/MIN: Performed by: ORTHOPAEDIC SURGERY

## 2020-07-15 PROCEDURE — 302135 SEQUENTIAL COMPRESSION MACHINE: Performed by: ORTHOPAEDIC SURGERY

## 2020-07-15 PROCEDURE — 700102 HCHG RX REV CODE 250 W/ 637 OVERRIDE(OP)

## 2020-07-15 PROCEDURE — 700102 HCHG RX REV CODE 250 W/ 637 OVERRIDE(OP): Performed by: ORTHOPAEDIC SURGERY

## 2020-07-15 PROCEDURE — 700102 HCHG RX REV CODE 250 W/ 637 OVERRIDE(OP): Performed by: ANESTHESIOLOGY

## 2020-07-15 PROCEDURE — 501838 HCHG SUTURE GENERAL: Performed by: ORTHOPAEDIC SURGERY

## 2020-07-15 PROCEDURE — 160002 HCHG RECOVERY MINUTES (STAT): Performed by: ORTHOPAEDIC SURGERY

## 2020-07-15 PROCEDURE — 502578 HCHG PACK, TOTAL HIP: Performed by: ORTHOPAEDIC SURGERY

## 2020-07-15 PROCEDURE — 160042 HCHG SURGERY MINUTES - EA ADDL 1 MIN LEVEL 5: Performed by: ORTHOPAEDIC SURGERY

## 2020-07-15 PROCEDURE — 700111 HCHG RX REV CODE 636 W/ 250 OVERRIDE (IP): Performed by: ORTHOPAEDIC SURGERY

## 2020-07-15 PROCEDURE — A9270 NON-COVERED ITEM OR SERVICE: HCPCS

## 2020-07-15 PROCEDURE — 502000 HCHG MISC OR IMPLANTS RC 0278: Performed by: ORTHOPAEDIC SURGERY

## 2020-07-15 PROCEDURE — 160035 HCHG PACU - 1ST 60 MINS PHASE I: Performed by: ORTHOPAEDIC SURGERY

## 2020-07-15 DEVICE — IMPLANTABLE DEVICE: Type: IMPLANTABLE DEVICE | Site: HIP | Status: FUNCTIONAL

## 2020-07-15 RX ORDER — AMOXICILLIN 250 MG
CAPSULE ORAL
Status: COMPLETED
Start: 2020-07-15 | End: 2020-07-15

## 2020-07-15 RX ORDER — AMOXICILLIN 250 MG
1 CAPSULE ORAL NIGHTLY
Status: DISCONTINUED | OUTPATIENT
Start: 2020-07-15 | End: 2020-07-17 | Stop reason: HOSPADM

## 2020-07-15 RX ORDER — LIDOCAINE HYDROCHLORIDE 20 MG/ML
INJECTION, SOLUTION EPIDURAL; INFILTRATION; INTRACAUDAL; PERINEURAL PRN
Status: DISCONTINUED | OUTPATIENT
Start: 2020-07-15 | End: 2020-07-15 | Stop reason: SURG

## 2020-07-15 RX ORDER — OXYCODONE HCL 5 MG/5 ML
10 SOLUTION, ORAL ORAL
Status: COMPLETED | OUTPATIENT
Start: 2020-07-15 | End: 2020-07-15

## 2020-07-15 RX ORDER — QUETIAPINE FUMARATE 25 MG/1
25 TABLET, FILM COATED ORAL
Status: DISCONTINUED | OUTPATIENT
Start: 2020-07-16 | End: 2020-07-17 | Stop reason: HOSPADM

## 2020-07-15 RX ORDER — SODIUM CHLORIDE, SODIUM LACTATE, POTASSIUM CHLORIDE, CALCIUM CHLORIDE 600; 310; 30; 20 MG/100ML; MG/100ML; MG/100ML; MG/100ML
INJECTION, SOLUTION INTRAVENOUS CONTINUOUS
Status: ACTIVE | OUTPATIENT
Start: 2020-07-15 | End: 2020-07-15

## 2020-07-15 RX ORDER — DIPHENHYDRAMINE HYDROCHLORIDE 50 MG/ML
12.5 INJECTION INTRAMUSCULAR; INTRAVENOUS
Status: DISCONTINUED | OUTPATIENT
Start: 2020-07-15 | End: 2020-07-15 | Stop reason: HOSPADM

## 2020-07-15 RX ORDER — ENEMA 19; 7 G/133ML; G/133ML
1 ENEMA RECTAL
Status: DISCONTINUED | OUTPATIENT
Start: 2020-07-15 | End: 2020-07-17 | Stop reason: HOSPADM

## 2020-07-15 RX ORDER — ONDANSETRON 2 MG/ML
4 INJECTION INTRAMUSCULAR; INTRAVENOUS
Status: DISCONTINUED | OUTPATIENT
Start: 2020-07-15 | End: 2020-07-15 | Stop reason: HOSPADM

## 2020-07-15 RX ORDER — AMOXICILLIN 250 MG
1 CAPSULE ORAL
Status: DISCONTINUED | OUTPATIENT
Start: 2020-07-15 | End: 2020-07-17 | Stop reason: HOSPADM

## 2020-07-15 RX ORDER — HYDROMORPHONE HYDROCHLORIDE 1 MG/ML
0.5 INJECTION, SOLUTION INTRAMUSCULAR; INTRAVENOUS; SUBCUTANEOUS
Status: DISCONTINUED | OUTPATIENT
Start: 2020-07-15 | End: 2020-07-17 | Stop reason: HOSPADM

## 2020-07-15 RX ORDER — HYDROMORPHONE HYDROCHLORIDE 2 MG/ML
INJECTION, SOLUTION INTRAMUSCULAR; INTRAVENOUS; SUBCUTANEOUS PRN
Status: DISCONTINUED | OUTPATIENT
Start: 2020-07-15 | End: 2020-07-15 | Stop reason: SURG

## 2020-07-15 RX ORDER — HYDROMORPHONE HYDROCHLORIDE 1 MG/ML
0.2 INJECTION, SOLUTION INTRAMUSCULAR; INTRAVENOUS; SUBCUTANEOUS
Status: DISCONTINUED | OUTPATIENT
Start: 2020-07-15 | End: 2020-07-15 | Stop reason: HOSPADM

## 2020-07-15 RX ORDER — DOCUSATE SODIUM 100 MG/1
100 CAPSULE, LIQUID FILLED ORAL 2 TIMES DAILY
Status: DISCONTINUED | OUTPATIENT
Start: 2020-07-15 | End: 2020-07-17 | Stop reason: HOSPADM

## 2020-07-15 RX ORDER — ONDANSETRON 2 MG/ML
INJECTION INTRAMUSCULAR; INTRAVENOUS PRN
Status: DISCONTINUED | OUTPATIENT
Start: 2020-07-15 | End: 2020-07-15 | Stop reason: SURG

## 2020-07-15 RX ORDER — CEFAZOLIN SODIUM 1 G/3ML
INJECTION, POWDER, FOR SOLUTION INTRAMUSCULAR; INTRAVENOUS PRN
Status: DISCONTINUED | OUTPATIENT
Start: 2020-07-15 | End: 2020-07-15 | Stop reason: SURG

## 2020-07-15 RX ORDER — DIPHENHYDRAMINE HYDROCHLORIDE 50 MG/ML
25 INJECTION INTRAMUSCULAR; INTRAVENOUS EVERY 6 HOURS PRN
Status: DISCONTINUED | OUTPATIENT
Start: 2020-07-15 | End: 2020-07-17 | Stop reason: HOSPADM

## 2020-07-15 RX ORDER — BISACODYL 10 MG
10 SUPPOSITORY, RECTAL RECTAL
Status: DISCONTINUED | OUTPATIENT
Start: 2020-07-15 | End: 2020-07-17 | Stop reason: HOSPADM

## 2020-07-15 RX ORDER — HALOPERIDOL 5 MG/ML
1 INJECTION INTRAMUSCULAR
Status: DISCONTINUED | OUTPATIENT
Start: 2020-07-15 | End: 2020-07-15 | Stop reason: HOSPADM

## 2020-07-15 RX ORDER — DEXAMETHASONE SODIUM PHOSPHATE 4 MG/ML
4 INJECTION, SOLUTION INTRA-ARTICULAR; INTRALESIONAL; INTRAMUSCULAR; INTRAVENOUS; SOFT TISSUE
Status: DISCONTINUED | OUTPATIENT
Start: 2020-07-15 | End: 2020-07-17 | Stop reason: HOSPADM

## 2020-07-15 RX ORDER — POLYETHYLENE GLYCOL 3350 17 G/17G
1 POWDER, FOR SOLUTION ORAL 2 TIMES DAILY PRN
Status: DISCONTINUED | OUTPATIENT
Start: 2020-07-15 | End: 2020-07-17 | Stop reason: HOSPADM

## 2020-07-15 RX ORDER — HYDROMORPHONE HYDROCHLORIDE 1 MG/ML
0.1 INJECTION, SOLUTION INTRAMUSCULAR; INTRAVENOUS; SUBCUTANEOUS
Status: DISCONTINUED | OUTPATIENT
Start: 2020-07-15 | End: 2020-07-15 | Stop reason: HOSPADM

## 2020-07-15 RX ORDER — MEPERIDINE HYDROCHLORIDE 25 MG/ML
12.5 INJECTION INTRAMUSCULAR; INTRAVENOUS; SUBCUTANEOUS
Status: DISCONTINUED | OUTPATIENT
Start: 2020-07-15 | End: 2020-07-15 | Stop reason: HOSPADM

## 2020-07-15 RX ORDER — HYDRALAZINE HYDROCHLORIDE 20 MG/ML
5 INJECTION INTRAMUSCULAR; INTRAVENOUS
Status: DISCONTINUED | OUTPATIENT
Start: 2020-07-15 | End: 2020-07-15 | Stop reason: HOSPADM

## 2020-07-15 RX ORDER — DEXAMETHASONE SODIUM PHOSPHATE 4 MG/ML
INJECTION, SOLUTION INTRA-ARTICULAR; INTRALESIONAL; INTRAMUSCULAR; INTRAVENOUS; SOFT TISSUE PRN
Status: DISCONTINUED | OUTPATIENT
Start: 2020-07-15 | End: 2020-07-15 | Stop reason: SURG

## 2020-07-15 RX ORDER — SODIUM CHLORIDE, SODIUM LACTATE, POTASSIUM CHLORIDE, CALCIUM CHLORIDE 600; 310; 30; 20 MG/100ML; MG/100ML; MG/100ML; MG/100ML
INJECTION, SOLUTION INTRAVENOUS CONTINUOUS
Status: DISCONTINUED | OUTPATIENT
Start: 2020-07-15 | End: 2020-07-15 | Stop reason: HOSPADM

## 2020-07-15 RX ORDER — DEXTROSE MONOHYDRATE, SODIUM CHLORIDE, AND POTASSIUM CHLORIDE 50; 1.49; 4.5 G/1000ML; G/1000ML; G/1000ML
INJECTION, SOLUTION INTRAVENOUS CONTINUOUS
Status: ACTIVE | OUTPATIENT
Start: 2020-07-15 | End: 2020-07-15

## 2020-07-15 RX ORDER — TRANEXAMIC ACID 100 MG/ML
INJECTION, SOLUTION INTRAVENOUS PRN
Status: DISCONTINUED | OUTPATIENT
Start: 2020-07-15 | End: 2020-07-15 | Stop reason: SURG

## 2020-07-15 RX ORDER — TRAMADOL HYDROCHLORIDE 50 MG/1
50 TABLET ORAL EVERY 6 HOURS
Status: DISCONTINUED | OUTPATIENT
Start: 2020-07-15 | End: 2020-07-17 | Stop reason: HOSPADM

## 2020-07-15 RX ORDER — OXYCODONE HCL 5 MG/5 ML
5 SOLUTION, ORAL ORAL
Status: COMPLETED | OUTPATIENT
Start: 2020-07-15 | End: 2020-07-15

## 2020-07-15 RX ORDER — OXYCODONE HYDROCHLORIDE 10 MG/1
10 TABLET ORAL
Status: DISCONTINUED | OUTPATIENT
Start: 2020-07-15 | End: 2020-07-17 | Stop reason: HOSPADM

## 2020-07-15 RX ORDER — MIDAZOLAM HYDROCHLORIDE 1 MG/ML
1 INJECTION INTRAMUSCULAR; INTRAVENOUS
Status: DISCONTINUED | OUTPATIENT
Start: 2020-07-15 | End: 2020-07-15 | Stop reason: HOSPADM

## 2020-07-15 RX ORDER — CELECOXIB 200 MG/1
200 CAPSULE ORAL 2 TIMES DAILY
Status: DISCONTINUED | OUTPATIENT
Start: 2020-07-15 | End: 2020-07-17 | Stop reason: HOSPADM

## 2020-07-15 RX ORDER — HALOPERIDOL 5 MG/ML
1 INJECTION INTRAMUSCULAR EVERY 6 HOURS PRN
Status: DISCONTINUED | OUTPATIENT
Start: 2020-07-15 | End: 2020-07-17 | Stop reason: HOSPADM

## 2020-07-15 RX ORDER — SCOLOPAMINE TRANSDERMAL SYSTEM 1 MG/1
1 PATCH, EXTENDED RELEASE TRANSDERMAL
Status: DISCONTINUED | OUTPATIENT
Start: 2020-07-15 | End: 2020-07-17 | Stop reason: HOSPADM

## 2020-07-15 RX ORDER — HYDROMORPHONE HYDROCHLORIDE 1 MG/ML
0.4 INJECTION, SOLUTION INTRAMUSCULAR; INTRAVENOUS; SUBCUTANEOUS
Status: DISCONTINUED | OUTPATIENT
Start: 2020-07-15 | End: 2020-07-15 | Stop reason: HOSPADM

## 2020-07-15 RX ORDER — ACETAMINOPHEN 500 MG
1000 TABLET ORAL EVERY 6 HOURS
Status: DISCONTINUED | OUTPATIENT
Start: 2020-07-15 | End: 2020-07-17 | Stop reason: HOSPADM

## 2020-07-15 RX ORDER — OXYCODONE HYDROCHLORIDE 5 MG/1
5 TABLET ORAL
Status: DISCONTINUED | OUTPATIENT
Start: 2020-07-15 | End: 2020-07-17 | Stop reason: HOSPADM

## 2020-07-15 RX ORDER — ONDANSETRON 2 MG/ML
4 INJECTION INTRAMUSCULAR; INTRAVENOUS EVERY 4 HOURS PRN
Status: DISCONTINUED | OUTPATIENT
Start: 2020-07-15 | End: 2020-07-17 | Stop reason: HOSPADM

## 2020-07-15 RX ORDER — MIDAZOLAM HYDROCHLORIDE 1 MG/ML
INJECTION INTRAMUSCULAR; INTRAVENOUS PRN
Status: DISCONTINUED | OUTPATIENT
Start: 2020-07-15 | End: 2020-07-15 | Stop reason: SURG

## 2020-07-15 RX ADMIN — MIDAZOLAM HYDROCHLORIDE 2 MG: 1 INJECTION, SOLUTION INTRAMUSCULAR; INTRAVENOUS at 12:32

## 2020-07-15 RX ADMIN — OXYCODONE HYDROCHLORIDE 5 MG: 5 SOLUTION ORAL at 14:11

## 2020-07-15 RX ADMIN — SENNOSIDES-DOCUSATE SODIUM TAB 8.6-50 MG 1 TABLET: 8.6-5 TAB at 16:32

## 2020-07-15 RX ADMIN — FENTANYL CITRATE 25 MCG: 50 INJECTION, SOLUTION INTRAMUSCULAR; INTRAVENOUS at 14:25

## 2020-07-15 RX ADMIN — CEFAZOLIN 2 G: 1 INJECTION, POWDER, FOR SOLUTION INTRAVENOUS at 12:41

## 2020-07-15 RX ADMIN — HYDROMORPHONE HYDROCHLORIDE 0.4 MG: 2 INJECTION, SOLUTION INTRAMUSCULAR; INTRAVENOUS; SUBCUTANEOUS at 13:49

## 2020-07-15 RX ADMIN — FENTANYL CITRATE 25 MCG: 50 INJECTION, SOLUTION INTRAMUSCULAR; INTRAVENOUS at 13:04

## 2020-07-15 RX ADMIN — FENTANYL CITRATE 50 MCG: 50 INJECTION, SOLUTION INTRAMUSCULAR; INTRAVENOUS at 14:42

## 2020-07-15 RX ADMIN — POTASSIUM CHLORIDE, DEXTROSE MONOHYDRATE AND SODIUM CHLORIDE: 150; 5; 450 INJECTION, SOLUTION INTRAVENOUS at 16:13

## 2020-07-15 RX ADMIN — TRAMADOL HYDROCHLORIDE 50 MG: 50 TABLET, FILM COATED ORAL at 16:32

## 2020-07-15 RX ADMIN — FENTANYL CITRATE 25 MCG: 50 INJECTION, SOLUTION INTRAMUSCULAR; INTRAVENOUS at 12:32

## 2020-07-15 RX ADMIN — SENNOSIDES-DOCUSATE SODIUM TAB 8.6-50 MG 1 TABLET: 8.6-5 TAB at 20:38

## 2020-07-15 RX ADMIN — TRANEXAMIC ACID 1000 MG: 100 INJECTION, SOLUTION INTRAVENOUS at 12:42

## 2020-07-15 RX ADMIN — FENTANYL CITRATE 25 MCG: 50 INJECTION, SOLUTION INTRAMUSCULAR; INTRAVENOUS at 14:11

## 2020-07-15 RX ADMIN — ACETAMINOPHEN 1000 MG: 500 TABLET, FILM COATED ORAL at 16:32

## 2020-07-15 RX ADMIN — ACETAMINOPHEN 1000 MG: 500 TABLET, FILM COATED ORAL at 22:44

## 2020-07-15 RX ADMIN — CEFAZOLIN 2 G: 10 INJECTION, POWDER, FOR SOLUTION INTRAVENOUS; PARENTERAL at 20:39

## 2020-07-15 RX ADMIN — ONDANSETRON 4 MG: 2 INJECTION INTRAMUSCULAR; INTRAVENOUS at 13:22

## 2020-07-15 RX ADMIN — FENTANYL CITRATE 25 MCG: 50 INJECTION, SOLUTION INTRAMUSCULAR; INTRAVENOUS at 13:21

## 2020-07-15 RX ADMIN — CELECOXIB 200 MG: 200 CAPSULE ORAL at 16:33

## 2020-07-15 RX ADMIN — FENTANYL CITRATE 25 MCG: 50 INJECTION, SOLUTION INTRAMUSCULAR; INTRAVENOUS at 12:50

## 2020-07-15 RX ADMIN — POVIDONE-IODINE 15 ML: 10 SOLUTION TOPICAL at 09:51

## 2020-07-15 RX ADMIN — SODIUM CHLORIDE, POTASSIUM CHLORIDE, SODIUM LACTATE AND CALCIUM CHLORIDE: 600; 310; 30; 20 INJECTION, SOLUTION INTRAVENOUS at 09:52

## 2020-07-15 RX ADMIN — DEXAMETHASONE SODIUM PHOSPHATE 4 MG: 4 INJECTION, SOLUTION INTRAMUSCULAR; INTRAVENOUS at 12:41

## 2020-07-15 RX ADMIN — DOCUSATE SODIUM 100 MG: 100 CAPSULE, LIQUID FILLED ORAL at 16:33

## 2020-07-15 RX ADMIN — LIDOCAINE HYDROCHLORIDE 0.5 ML: 10 INJECTION, SOLUTION INFILTRATION; PERINEURAL at 09:52

## 2020-07-15 RX ADMIN — TRAMADOL HYDROCHLORIDE 50 MG: 50 TABLET, FILM COATED ORAL at 22:44

## 2020-07-15 RX ADMIN — LIDOCAINE HYDROCHLORIDE 60 MG: 20 INJECTION, SOLUTION EPIDURAL; INFILTRATION; INTRACAUDAL; PERINEURAL at 12:37

## 2020-07-15 RX ADMIN — PROPOFOL 150 MG: 10 INJECTION, EMULSION INTRAVENOUS at 12:37

## 2020-07-15 ASSESSMENT — COGNITIVE AND FUNCTIONAL STATUS - GENERAL
DRESSING REGULAR LOWER BODY CLOTHING: A LITTLE
MOVING TO AND FROM BED TO CHAIR: A LITTLE
TURNING FROM BACK TO SIDE WHILE IN FLAT BAD: A LITTLE
WALKING IN HOSPITAL ROOM: A LITTLE
SUGGESTED CMS G CODE MODIFIER DAILY ACTIVITY: CJ
TOILETING: A LITTLE
MOVING FROM LYING ON BACK TO SITTING ON SIDE OF FLAT BED: A LITTLE
CLIMB 3 TO 5 STEPS WITH RAILING: A LOT
HELP NEEDED FOR BATHING: A LITTLE
DAILY ACTIVITIY SCORE: 21
SUGGESTED CMS G CODE MODIFIER MOBILITY: CK
STANDING UP FROM CHAIR USING ARMS: A LITTLE
MOBILITY SCORE: 17

## 2020-07-15 ASSESSMENT — LIFESTYLE VARIABLES
ON A TYPICAL DAY WHEN YOU DRINK ALCOHOL HOW MANY DRINKS DO YOU HAVE: 0
TOTAL SCORE: 0
HOW MANY TIMES IN THE PAST YEAR HAVE YOU HAD 5 OR MORE DRINKS IN A DAY: 0
CONSUMPTION TOTAL: NEGATIVE
AVERAGE NUMBER OF DAYS PER WEEK YOU HAVE A DRINK CONTAINING ALCOHOL: 0
TOTAL SCORE: 0
HAVE YOU EVER FELT YOU SHOULD CUT DOWN ON YOUR DRINKING: NO
EVER_SMOKED: NEVER
ALCOHOL_USE: NO
EVER HAD A DRINK FIRST THING IN THE MORNING TO STEADY YOUR NERVES TO GET RID OF A HANGOVER: NO
HAVE PEOPLE ANNOYED YOU BY CRITICIZING YOUR DRINKING: NO
TOTAL SCORE: 0
EVER FELT BAD OR GUILTY ABOUT YOUR DRINKING: NO

## 2020-07-15 ASSESSMENT — COPD QUESTIONNAIRES
IN THE PAST 12 MONTHS DO YOU DO LESS THAN YOU USED TO BECAUSE OF YOUR BREATHING PROBLEMS: DISAGREE/UNSURE
DURING THE PAST 4 WEEKS HOW MUCH DID YOU FEEL SHORT OF BREATH: NONE/LITTLE OF THE TIME
HAVE YOU SMOKED AT LEAST 100 CIGARETTES IN YOUR ENTIRE LIFE: NO/DON'T KNOW
COPD SCREENING SCORE: 2
DO YOU EVER COUGH UP ANY MUCUS OR PHLEGM?: NO/ONLY WITH OCCASIONAL COLDS OR INFECTIONS

## 2020-07-15 ASSESSMENT — PATIENT HEALTH QUESTIONNAIRE - PHQ9
2. FEELING DOWN, DEPRESSED, IRRITABLE, OR HOPELESS: NOT AT ALL
SUM OF ALL RESPONSES TO PHQ9 QUESTIONS 1 AND 2: 0
1. LITTLE INTEREST OR PLEASURE IN DOING THINGS: NOT AT ALL

## 2020-07-15 ASSESSMENT — PAIN SCALES - GENERAL: PAIN_LEVEL: 2

## 2020-07-15 NOTE — OR SURGEON
Immediate Post OP Note    PreOp Diagnosis: Lt hip DJD    PostOp Diagnosis: same    Procedure(s):  ARTHROPLASTY, HIP, TOTAL - Wound Class: Clean    Surgeon(s):  Neville Camacho M.D.    Anesthesiologist/Type of Anesthesia:  Anesthesiologist: Roberto Hopkins M.D./General    Surgical Staff:  Circulator: Thao Temple R.N.  Scrub Person: Otilio Carmona; Ty Cantu    Specimens removed if any:  * No specimens in log *    Estimated Blood Loss: minimal    Findings: severe DJD lt hip    Complications: none        7/15/2020 1:55 PM Neville Camacho M.D.

## 2020-07-15 NOTE — CARE PLAN
Problem: Communication  Goal: The ability to communicate needs accurately and effectively will improve  Outcome: PROGRESSING AS EXPECTED     Problem: Safety  Goal: Will remain free from injury  Outcome: PROGRESSING AS EXPECTED     Problem: Venous Thromboembolism (VTW)/Deep Vein Thrombosis (DVT) Prevention:  Goal: Patient will participate in Venous Thrombosis (VTE)/Deep Vein Thrombosis (DVT)Prevention Measures  Outcome: PROGRESSING AS EXPECTED     Problem: Bowel/Gastric:  Goal: Normal bowel function is maintained or improved  Outcome: PROGRESSING AS EXPECTED     Problem: Knowledge Deficit  Goal: Knowledge of the prescribed therapeutic regimen will improve  Outcome: PROGRESSING AS EXPECTED     Problem: Discharge Barriers/Planning  Goal: Patient's continuum of care needs will be met  Outcome: PROGRESSING AS EXPECTED     Problem: Pain Management  Goal: Pain level will decrease to patient's comfort goal  Outcome: PROGRESSING AS EXPECTED     Problem: Fluid Volume:  Goal: Will maintain balanced intake and output  Outcome: PROGRESSING AS EXPECTED

## 2020-07-15 NOTE — ANESTHESIA PROCEDURE NOTES
Airway    Date/Time: 7/15/2020 12:38 PM  Performed by: Roberto Hopkins M.D.  Authorized by: Roberto Hopkins M.D.     Location:  OR  Urgency:  Elective  Indications for Airway Management:  Anesthesia      Spontaneous Ventilation: absent    Sedation Level:  Deep  Preoxygenated: Yes    Final Airway Type:  Supraglottic airway  Final Supraglottic Airway:  Standard LMA    SGA Size:  3  Number of Attempts at Approach:  1

## 2020-07-15 NOTE — ANESTHESIA PREPROCEDURE EVALUATION
Relevant Problems   No relevant active problems       Physical Exam    Airway   Mallampati: III  TM distance: >3 FB  Neck ROM: full       Cardiovascular - normal exam  Rhythm: regular  Rate: normal  (-) murmur     Dental - normal exam  (+) upper dentures, lower dentures           Pulmonary - normal exam  Breath sounds clear to auscultation     Abdominal    Neurological - normal exam                 Anesthesia Plan    ASA 2       Plan - general       Airway plan will be LMA        Induction: intravenous    Postoperative Plan: Postoperative administration of opioids is intended.    Pertinent diagnostic labs and testing reviewed    Informed Consent:    Anesthetic plan and risks discussed with patient.    Use of blood products discussed with: patient whom consented to blood products.

## 2020-07-15 NOTE — OR NURSING
1355: To PACU from OR via bed, sleeping,  respirations spontaneous and non-labored.   Icepack applied over c/d/i L hip surgical dressings. Abduction pillow in place between legs. +1 L DP.  1400: Restless, assumed pain present but pt's POSS=3 so not medicated at this time  1410: More awake, Xrays taken, c/o severe pain L hip, medicated at lower dose for safety due to pt weight.  1425: No change. Medicated further.  1433: resting quietly with eyes closed  1440: Continues to c/o severe pain  1455: pt c/o pain when roused but frequently dozes off so not medicated further.    1457: No change in surgical site assessment. Meets criteria to transfer to floor, awaiting room/RN assignment.

## 2020-07-15 NOTE — ANESTHESIA TIME REPORT
Anesthesia Start and Stop Event Times     Date Time Event    7/15/2020 1225 Ready for Procedure     1230 Anesthesia Start     1357 Anesthesia Stop        Responsible Staff  07/15/20    Name Role Begin End    Roberto Hopkins M.D. Anesth 1230 1357        Preop Diagnosis (Free Text):  Pre-op Diagnosis     ARTHRITIS OF HIP        Preop Diagnosis (Codes):    Post op Diagnosis  OA (osteoarthritis) of hip      Premium Reason  Non-Premium    Comments:

## 2020-07-15 NOTE — PROGRESS NOTES
Report received from PACU RN. Assume care. Pt. AAOx4 pt is bed,  Assessment completed. VSS, c/o L hip pain-will medicated shortly,  pain, able to wiggle toes and dorsi/plantar flex feet, good CMS and pulses to annabella LE, denies numbness and tingling. Dressing in place DCI, Pt was update for the care for the day. White board updated, All question answered. Pt has call light within reach,  bed is in the lowest position. Pt has no other needs at this time.

## 2020-07-15 NOTE — ANESTHESIA POSTPROCEDURE EVALUATION
Patient: Generosa Apalla Chua    Procedure Summary     Date:  07/15/20 Room / Location:   OR  / SURGERY [facility]    Anesthesia Start:  1230 Anesthesia Stop:  1357    Procedure:  ARTHROPLASTY, HIP, TOTAL (Left Hip) Diagnosis:  (ARTHRITIS OF HIP)    Surgeon:  Neville Camacho M.D. Responsible Provider:  Roberto Hopkins M.D.    Anesthesia Type:  general ASA Status:  2          Final Anesthesia Type: general  Last vitals  BP   Blood Pressure : 137/87, NIBP: (P) 146/73    Temp   (P) 36.4 °C (97.5 °F)    Pulse   Pulse: (P) 84   Resp 16   SpO2   (P) 100 %      Anesthesia Post Evaluation    Patient location during evaluation: PACU  Patient participation: complete - patient participated  Level of consciousness: awake and alert  Pain score: 2    Airway patency: patent  Anesthetic complications: no  Cardiovascular status: hemodynamically stable  Respiratory status: acceptable  Hydration status: euvolemic    PONV: none

## 2020-07-16 PROCEDURE — 700105 HCHG RX REV CODE 258: Performed by: ORTHOPAEDIC SURGERY

## 2020-07-16 PROCEDURE — A9270 NON-COVERED ITEM OR SERVICE: HCPCS | Performed by: ORTHOPAEDIC SURGERY

## 2020-07-16 PROCEDURE — 96375 TX/PRO/DX INJ NEW DRUG ADDON: CPT

## 2020-07-16 PROCEDURE — 700111 HCHG RX REV CODE 636 W/ 250 OVERRIDE (IP): Performed by: ORTHOPAEDIC SURGERY

## 2020-07-16 PROCEDURE — 700112 HCHG RX REV CODE 229: Performed by: ORTHOPAEDIC SURGERY

## 2020-07-16 PROCEDURE — 97162 PT EVAL MOD COMPLEX 30 MIN: CPT

## 2020-07-16 PROCEDURE — G0378 HOSPITAL OBSERVATION PER HR: HCPCS

## 2020-07-16 PROCEDURE — 700102 HCHG RX REV CODE 250 W/ 637 OVERRIDE(OP): Performed by: ORTHOPAEDIC SURGERY

## 2020-07-16 PROCEDURE — 700101 HCHG RX REV CODE 250: Performed by: ORTHOPAEDIC SURGERY

## 2020-07-16 RX ADMIN — TRAMADOL HYDROCHLORIDE 50 MG: 50 TABLET, FILM COATED ORAL at 13:02

## 2020-07-16 RX ADMIN — CELECOXIB 200 MG: 200 CAPSULE ORAL at 17:52

## 2020-07-16 RX ADMIN — ASPIRIN 325 MG: 325 TABLET, DELAYED RELEASE ORAL at 23:21

## 2020-07-16 RX ADMIN — ACETAMINOPHEN 1000 MG: 500 TABLET, FILM COATED ORAL at 13:02

## 2020-07-16 RX ADMIN — ASPIRIN 325 MG: 325 TABLET, DELAYED RELEASE ORAL at 13:02

## 2020-07-16 RX ADMIN — POLYETHYLENE GLYCOL 3350 1 PACKET: 17 POWDER, FOR SOLUTION ORAL at 17:52

## 2020-07-16 RX ADMIN — ACETAMINOPHEN 1000 MG: 500 TABLET, FILM COATED ORAL at 23:20

## 2020-07-16 RX ADMIN — CEFAZOLIN 2 G: 10 INJECTION, POWDER, FOR SOLUTION INTRAVENOUS; PARENTERAL at 04:34

## 2020-07-16 RX ADMIN — CELECOXIB 200 MG: 200 CAPSULE ORAL at 04:35

## 2020-07-16 RX ADMIN — TRAMADOL HYDROCHLORIDE 50 MG: 50 TABLET, FILM COATED ORAL at 04:35

## 2020-07-16 RX ADMIN — DOCUSATE SODIUM 100 MG: 100 CAPSULE, LIQUID FILLED ORAL at 04:34

## 2020-07-16 RX ADMIN — TRAMADOL HYDROCHLORIDE 50 MG: 50 TABLET, FILM COATED ORAL at 17:52

## 2020-07-16 RX ADMIN — ONDANSETRON 4 MG: 2 INJECTION INTRAMUSCULAR; INTRAVENOUS at 12:01

## 2020-07-16 RX ADMIN — ACETAMINOPHEN 1000 MG: 500 TABLET, FILM COATED ORAL at 17:52

## 2020-07-16 RX ADMIN — SENNOSIDES-DOCUSATE SODIUM TAB 8.6-50 MG 1 TABLET: 8.6-5 TAB at 19:49

## 2020-07-16 RX ADMIN — ACETAMINOPHEN 1000 MG: 500 TABLET, FILM COATED ORAL at 04:34

## 2020-07-16 RX ADMIN — QUETIAPINE FUMARATE 25 MG: 25 TABLET ORAL at 19:48

## 2020-07-16 ASSESSMENT — GAIT ASSESSMENTS
ASSISTIVE DEVICE: FRONT WHEEL WALKER
GAIT LEVEL OF ASSIST: SUPERVISED
DEVIATION: STEP TO;ANTALGIC
DISTANCE (FEET): 150

## 2020-07-16 ASSESSMENT — COGNITIVE AND FUNCTIONAL STATUS - GENERAL
MOBILITY SCORE: 23
SUGGESTED CMS G CODE MODIFIER MOBILITY: CI
CLIMB 3 TO 5 STEPS WITH RAILING: A LITTLE

## 2020-07-16 NOTE — PROGRESS NOTES
"Total Joint Replacement Program Rounding     Inpatient bedside rounding completed to address quality of care provided by total joint replacement program IDT and overall patient experience at [facility].    Patient Satisfaction addressed. Patient/family updated on POC during hospital stay.  Thorough safety education completed including use of call light prior to all mobility throughout the entirety of the hospital stay. Also educated on ankle pumps and incentive inspirometry use to prevent post-op complications.    Followed up with LSW re: SNF consult. They will follow up with the RN and patient.     No further questions/concerns at this time. Please see \"MyRounding\" for further details of rounding discussion. RN updated.         "

## 2020-07-16 NOTE — OP REPORT
DATE OF SERVICE:  07/15/2020    PREOPERATIVE DIAGNOSIS:  Left hip degenerative joint disease.    POSTOPERATIVE DIAGNOSIS:  Left hip degenerative joint disease.    PROCEDURE:  Left total hip arthroplasty.    SURGEON:  Neville Camacho MD.    ASSISTANT:  Kami Hutson.    ESTIMATED BLOOD LOSS:  Minimal.    COMPLICATIONS:  None.    ANESTHESIA:  General endotracheal anesthesia.    ANESTHESIOLOGIST:  Roberto Hopkins MD.    INDICATIONS FOR PROCEDURE:  This is a 69-year-old female who has severe   degenerative joint disease of her left hip.  She has failed conservative   management including home exercise program, antiinflammatories, and   intraarticular injections.  She presents for operative treatment.  For further   details of H and P, please see chart.    Risks, benefits, and alternatives of the procedure were discussed with the   patient including but not limited to bleeding, infection, neurovascular   injury, need for further surgery, dislocation, PE, DVT, blood transfusion with   its associated risks, anesthesia with its associated risks, and death.  All   questions were answered.  No warranties or guarantees were given or implied.    Consent is signed and is on chart.    DESCRIPTION OF PROCEDURE:  The patient was taken to the operating room, placed   supine on the operating table.  General endotracheal anesthesia was induced.    The patient was placed in right lateral decubitus position.  Axillary roll   was placed, and all bony prominences were well padded.  Left buttock and lower   extremity were prepped and draped in normal sterile fashion.  A 16 cm   incision was made centered over the greater trochanter.  This was taken down   sharply through skin and subcutaneous tissue.  Bovie cautery was used to   obtain hemostasis.  Dissection was carried down to the level of IT band.  The   IT band was split in line with the incision.  The short external rotators and   capsule were removed off the posterior  aspect of the femur.  This was then   T'd.  Hip was dislocated.  Capsule was cleaned off of the femoral neck.  The   ConforMIS femoral neck cutting guide was placed in appropriate position and   pinned.  Neck resection was performed.  At this point, retractors were placed   around the acetabulum.  The labrum was excised and any tissue was incised out   of the fovea.  The A1 guide was placed and seated quite nicely.  This was   pinned into position.  Two lug holes were drilled.  A2 guide was impacted into   position and seated quite nicely.  The open face reamer was placed over the   top of this and reamed to appropriate depth.  The guide was then removed.  The   closed face reamer was placed and the remainder of the acetabulum was reamed.    A3 guide was placed and rotation was marked.  The acetabular component,   which was 15 mm component was placed.  This seated nicely in appropriate   orientation with appropriate version and inclination.  A single screw was   placed on the superior quadrant.  A standard +2 liner was then impacted for a   36 mm head.  Attention was then placed on the femur.  Soft tissue was removed   off the medial aspect of the greater trochanter.  The F1 guide was placed on   the femoral neck.  The cookie cutter placed through this and this was removed.    The canal finder and lateralizing reamer were then placed.  The F2 guide was   then placed back and the 9 mm broach placed through this to determine   appropriate version.  This was then broached 9, 10, 11 and 12; 12 had   excellent fit and appropriate orientation.  This was removed.  The femur was   thoroughly lavaged with no cracks or other abnormalities.  The ConforMIS size   12 patient specific stem was then impacted into position.  This was trialed   with a 36 mm medium head, which had excellent leg lengths with good stability   in flexion, adduction and internal rotation to 70 degrees.  At this point, the   hip was dislocated.  Trial  head was removed.  A 36 mm medium head was   impacted into position.  This was again reduced which showed excellent leg   lengths and good stability.  The hip was lavaged with diluted Betadine.  It   was then re-lavaged with saline.  Capsule was closed using #1 Vicryl.  IT band   closed using #2 Quill.  Subcutaneous tissue closed using 2-0 Vicryl.  Skin   closed using staples.  The patient was placed in a soft sterile dressing and   abduction pillow.  She was awakened from anesthesia and returned to recovery   cart in the recovery room in stable condition.  There were no eloisa or   intraoperative complications noted.       ____________________________________     MD ROCHELLE John / REYNALDO    DD:  07/15/2020 14:00:55  DT:  07/15/2020 17:26:12    D#:  [id number]  Job#:  [id number]

## 2020-07-16 NOTE — PROGRESS NOTES
Report received from night shift RN. Assume care. Pt. AAOx4 pt is bed,  Assessment completed. VSS. Denies pain, able to wiggle toes and dorsi/plantar flex feet, good CMS and pulses to annabella LE, denies numbness and tingling. Dressing in place DCI, Pt was update for the care for the day. White board updated, All question answered. Pt has call light within reach,  bed is in the lowest position. Pt has no other needs at this time.

## 2020-07-16 NOTE — CARE PLAN
Problem: Communication  Goal: The ability to communicate needs accurately and effectively will improve  Outcome: PROGRESSING AS EXPECTED     Problem: Safety  Goal: Will remain free from injury  Outcome: PROGRESSING AS EXPECTED  Goal: Will remain free from falls  Outcome: PROGRESSING AS EXPECTED     Problem: Venous Thromboembolism (VTW)/Deep Vein Thrombosis (DVT) Prevention:  Goal: Patient will participate in Venous Thrombosis (VTE)/Deep Vein Thrombosis (DVT)Prevention Measures  Outcome: PROGRESSING AS EXPECTED     Problem: Bowel/Gastric:  Goal: Normal bowel function is maintained or improved  Outcome: PROGRESSING AS EXPECTED     Problem: Knowledge Deficit  Goal: Knowledge of disease process/condition, treatment plan, diagnostic tests, and medications will improve  Outcome: PROGRESSING AS EXPECTED     Problem: Discharge Barriers/Planning  Goal: Patient's continuum of care needs will be met  Outcome: PROGRESSING AS EXPECTED     Problem: Fluid Volume:  Goal: Will maintain balanced intake and output  Outcome: PROGRESSING AS EXPECTED     Problem: Mobility  Goal: Risk for activity intolerance will decrease  Outcome: PROGRESSING AS EXPECTED

## 2020-07-16 NOTE — CARE PLAN
Problem: Communication  Goal: The ability to communicate needs accurately and effectively will improve  Outcome: PROGRESSING AS EXPECTED  Note: Pt calls appropriately, makes needs known     Problem: Safety  Goal: Will remain free from injury  Outcome: PROGRESSING AS EXPECTED  Intervention: Provide assistance with mobility  Flowsheets (Taken 7/16/2020 0303)  Assistance: Assistance of One  Ambulation Tolerance: Tolerates Well  Goal: Will remain free from falls  Outcome: PROGRESSING AS EXPECTED  Intervention: Assess risk factors for falls  Flowsheets (Taken 7/16/2020 0303)  Pt Calls for Assistance: Yes  History of fall: 0  Mobility Status Assessment: 1-1 Healthcare Provider Required for Assistance with Ambulation & Transfer  Intervention: Implement fall precautions  Flowsheets (Taken 7/15/2020 2000)  Environmental Precautions:   Treaded Slipper Socks on Patient   Personal Belongings, Wastebasket, Call Bell etc. in Easy Reach   Transferred to Stronger Side   Report Given to Other Health Care Providers Regarding Fall Risk   Bed in Low Position   Communication Sign for Patients & Families   Mobility Assessed & Appropriate Sign Placed     Problem: Infection  Goal: Will remain free from infection  Outcome: PROGRESSING AS EXPECTED  Note: No s/s infection     Problem: Venous Thromboembolism (VTW)/Deep Vein Thrombosis (DVT) Prevention:  Goal: Patient will participate in Venous Thrombosis (VTE)/Deep Vein Thrombosis (DVT)Prevention Measures  Outcome: PROGRESSING AS EXPECTED     Problem: Bowel/Gastric:  Goal: Normal bowel function is maintained or improved  Outcome: PROGRESSING AS EXPECTED  Goal: Will not experience complications related to bowel motility  Outcome: PROGRESSING AS EXPECTED     Problem: Knowledge Deficit  Goal: Knowledge of disease process/condition, treatment plan, diagnostic tests, and medications will improve  Outcome: PROGRESSING AS EXPECTED  Goal: Knowledge of the prescribed therapeutic regimen will  improve  Outcome: PROGRESSING AS EXPECTED     Problem: Pain Management  Goal: Pain level will decrease to patient's comfort goal  Outcome: PROGRESSING AS EXPECTED  Note: Pain managed with current options     Problem: Discharge Barriers/Planning  Goal: Patient's continuum of care needs will be met  Outcome: PROGRESSING SLOWER THAN EXPECTED  Note: Pt planning for SNF placement/d/c

## 2020-07-16 NOTE — THERAPY
Physical Therapy   Initial Evaluation     Patient Name: Generosa Apalla Chua  Age:  69 y.o., Sex:  female  Medical Record #: [id number]  Today's Date: 7/16/2020     Precautions: (P) Posterior Hip Precautions, Weight Bearing As Tolerated Left Lower Extremity    Assessment  Patient is 69 y.o. female with a diagnosis of L THR post.Pt lives at home alone and is active has been using a quads cane because of painful hip.Acute PT needed to improve transfers,ambulation and stairs         Plan    Recommend Physical Therapy daily until therapy goals are met for the following treatments:  Gait Training, Stair Training, Therapeutic Activities and Therapeutic Exercises    Discharge recommendations Recommend post-acute placement for continued physical therapy services prior to discharge home.          07/16/20 0900   Prior Living Situation   Prior Services None   Housing / Facility 2 Story Apartment / Condo   Steps Into Home 15   Equipment Owned Front-Wheel Walker;Quad Cane   Lives with - Patient's Self Care Capacity Alone and Able to Care For Self   Prior Level of Functional Mobility   Bed Mobility Independent   Transfer Status Independent   Ambulation Independent   Distance Ambulation (Feet)   (limited community)   Assistive Devices Used Quad Cane   Stairs Independent   Balance Assessment   Sitting Balance (Static) Good   Sitting Balance (Dynamic) Good   Standing Balance (Static) Fair +   Standing Balance (Dynamic) Fair +   Weight Shift Sitting Good   Weight Shift Standing Good   Gait Analysis   Gait Level Of Assist Supervised   Assistive Device Front Wheel Walker   Distance (Feet) 150   # of Times Distance was Traveled 1   Deviation Step To;Antalgic   # of Stairs Climbed 0   Weight Bearing Status wbat L   Bed Mobility    Supine to Sit Modified Independent   Sit to Supine Modified Independent   Scooting Modified Independent   Functional Mobility   Sit to Stand Supervised   Bed, Chair, Wheelchair Transfer Supervised   Transfer  Method Stand Pivot   Activity Tolerance   Sitting in Chair > 1hr   Sitting Edge of Bed 10   Standing 10   Patient / Family Goals    Patient / Family Goal #1 SNF then Home   Short Term Goals    Short Term Goal # 1 Pt to be I with transfers in 6 V so can D/C home   Short Term Goal # 2 Pt to be I with ambulation x 200 feet in 6 V so can D/C home   Short Term Goal # 3 Pt to be I with 15 stairs in 6 V so can get into house   Problem List    Problems Impaired Transfers;Impaired Ambulation;Functional ROM Deficit;Functional Strength Deficit;Impaired Balance   Anticipated Discharge Equipment   DC Equipment None

## 2020-07-16 NOTE — DIETARY
Nutrition Services - Weight loss reported on admission. Malnutrition Screening Tool score <2. PO x 1 meal was % and 50-75% of a supplement. Pt with positive nutrition screen upon admit due to report that pt drank supplements PTA. Current PO intake appears to be adequate to encourage healing. Nutrition assessment not indicated at this time. RD will monitor per department guidelines. Please consult RD for nutrition concerns.

## 2020-07-16 NOTE — THERAPY
Missed Therapy     Patient Name: Generosa Apalla Chua  Age:  69 y.o., Sex:  female  Medical Record #: [id number]  Today's Date: 7/16/2020    Discussed missed therapy with RN       07/16/20 1204   Interdisciplinary Plan of Care Collaboration   Collaboration Comments  OT orders rec'd, pt currently nauseated with headache.  Will try back later as pt not planning for d/c today.  If still feeling poorly, may defer OT eval until tomorrow.

## 2020-07-16 NOTE — PROGRESS NOTES
Minimal pain  AVSS  Dressing c/d/i  Motor intact ehl/fhl  Sensation intact sp/dp/pt  dp 2+    S/p Lt JACQUELINE  Patient unable to go home due to living situation  SNF placement

## 2020-07-16 NOTE — PROGRESS NOTES
Report received from JOSE Vazquez RN. Pt awake, alert, appropriate and pleasant. Pt has ambulated to bathroom once and voided. Pt reports pain controlled. Dressing CDI with no drainage. CMS intact. VSS. /68   Pulse 71   Temp 36.6 °C (97.8 °F) (Oral)   Resp 18   Ht 1.524 m (5')   Wt 46 kg (101 lb 6.6 oz)   SpO2 100%   BMI 19.81 kg/m²      Call light in reach. Pt calls appropriately. Denies needs at this time. Will continue to monitor.

## 2020-07-16 NOTE — DISCHARGE PLANNING
Met with pt. Reviewed code 44 and medicare inpatient vs observation. She understands. Informational paper given. Reviewed Medicare.gov website. Patient portion of code 44 signed and emailed back to Araceli. Reviewed with Destiny JOY regarding this. Will not impact Dc plan as inpt enforcement of 3 midnights is currently waived with covid. No further needs at this time.

## 2020-07-17 VITALS
DIASTOLIC BLOOD PRESSURE: 58 MMHG | OXYGEN SATURATION: 94 % | RESPIRATION RATE: 18 BRPM | SYSTOLIC BLOOD PRESSURE: 118 MMHG | WEIGHT: 101.41 LBS | HEIGHT: 60 IN | BODY MASS INDEX: 19.91 KG/M2 | HEART RATE: 68 BPM | TEMPERATURE: 97.6 F

## 2020-07-17 PROCEDURE — A9270 NON-COVERED ITEM OR SERVICE: HCPCS | Performed by: ORTHOPAEDIC SURGERY

## 2020-07-17 PROCEDURE — 700102 HCHG RX REV CODE 250 W/ 637 OVERRIDE(OP): Performed by: ORTHOPAEDIC SURGERY

## 2020-07-17 PROCEDURE — G0378 HOSPITAL OBSERVATION PER HR: HCPCS

## 2020-07-17 PROCEDURE — 97165 OT EVAL LOW COMPLEX 30 MIN: CPT

## 2020-07-17 PROCEDURE — 700112 HCHG RX REV CODE 229: Performed by: ORTHOPAEDIC SURGERY

## 2020-07-17 RX ORDER — ACETAMINOPHEN 500 MG
1000 TABLET ORAL EVERY 6 HOURS
Qty: 30 TAB | Refills: 0 | Status: SHIPPED | OUTPATIENT
Start: 2020-07-17

## 2020-07-17 RX ORDER — TRAMADOL HYDROCHLORIDE 50 MG/1
50 TABLET ORAL EVERY 6 HOURS
Qty: 30 TAB | Refills: 0
Start: 2020-07-17 | End: 2020-07-27

## 2020-07-17 RX ORDER — ASPIRIN 325 MG
325 TABLET, DELAYED RELEASE (ENTERIC COATED) ORAL DAILY
Qty: 30 TAB | Refills: 0
Start: 2020-07-17

## 2020-07-17 RX ORDER — OXYCODONE HYDROCHLORIDE 5 MG/1
5 TABLET ORAL
Qty: 30 TAB | Refills: 0
Start: 2020-07-17 | End: 2020-07-22

## 2020-07-17 RX ADMIN — CELECOXIB 200 MG: 200 CAPSULE ORAL at 08:08

## 2020-07-17 RX ADMIN — ACETAMINOPHEN 1000 MG: 500 TABLET, FILM COATED ORAL at 08:08

## 2020-07-17 RX ADMIN — ASPIRIN 325 MG: 325 TABLET, DELAYED RELEASE ORAL at 11:33

## 2020-07-17 RX ADMIN — DOCUSATE SODIUM 100 MG: 100 CAPSULE, LIQUID FILLED ORAL at 08:08

## 2020-07-17 RX ADMIN — MAGNESIUM HYDROXIDE 30 ML: 400 SUSPENSION ORAL at 05:23

## 2020-07-17 RX ADMIN — BISACODYL 10 MG: 10 SUPPOSITORY RECTAL at 11:37

## 2020-07-17 ASSESSMENT — COGNITIVE AND FUNCTIONAL STATUS - GENERAL
DRESSING REGULAR LOWER BODY CLOTHING: A LITTLE
DAILY ACTIVITIY SCORE: 22
HELP NEEDED FOR BATHING: A LITTLE
SUGGESTED CMS G CODE MODIFIER DAILY ACTIVITY: CJ

## 2020-07-17 ASSESSMENT — ACTIVITIES OF DAILY LIVING (ADL): TOILETING: INDEPENDENT

## 2020-07-17 NOTE — DISCHARGE PLANNING
Received Choice form at 0900  Agency/Facility Name: [facility] [place] [facility]  Referral sent per Choice form at 0910.      @0915  Agency/Facility Name: [facility]   Spoke To: Kylah   Outcome: Pt is accepted, [facility] informed Kylah that pt is still on Observation, Kylah asks that reason for moving pt while still on obs be added to d/c summary, [facility] informed LSW CARI Vera.        @0920  Agency/Facility Name: [facility]   Spoke To: Radha   Outcome: Pt is accepted, Radha states the facility will have to submit a waiver to accept the pt while still on Observation, the waiver will have to be submitted before 1500 today.       @0925  Agency/Facility Name: [facility]   Spoke To: Hollie   Outcome: Pt is accepted, but will need the 3 night inpatient stay piror to admission.         @0950  Agency/Facility Name: [facility]   Spoke To: Nidhi   Outcome: Pt is accepted and can be admitted while still on Observation.      @1020  Nidhi with [facility] states they can take pt while PASRR is in review and a bed is available today, however all [facility] Vans are busy so Case Management is asked to set up transport.

## 2020-07-17 NOTE — DISCHARGE PLANNING
Received Transport Form at 1050  Spoke to Satinder at [email or web address]     Transport is scheduled for today at 1430 going to [facility].      @1417  [facility] provided PASRR number to Nidhi at [facility].

## 2020-07-17 NOTE — PROGRESS NOTES
1230: Paged Dr. Camacho about pt not having prescriptions in the hospital to take to [facility].   1245: Repaged.   1330: Repaged. MD paged back. Stated that pt has prescriptions at home, he will not write more, pt's family must bring.   1344: Called pt's nephew. No answer. Left message.   1349: Called pt's brother. Explained that prescriptions need to be brought to hospital or [facility]. Brother to call nephew to bring prescriptions. Brother stated when nephew off of work, they will bring prescriptions to [facility]. This RN will verify later today.

## 2020-07-17 NOTE — CARE PLAN
Problem: Pain Management  Goal: Pain level will decrease to patient's comfort goal  Outcome: PROGRESSING AS EXPECTED  Note: Pt denied any pain at this moment   Polar ice to surgical site/ L-hip; pt on scheduled Tylenol 1000mg q6 hours and Tramadol 50mg q6 hours   Educated pt to the the nurse if pain remains uncontrolled; pt verbalized understanding      Problem: Mobility  Goal: Risk for activity intolerance will decrease  Outcome: PROGRESSING AS EXPECTED  Note: Pt required a standby assist with FWW to ambulate   Bed alarm on, educated to call for any assistance such as to the bathroom

## 2020-07-17 NOTE — DISCHARGE PLANNING
PASRR completed #[id number]    LOC in manual review     COBRA packet completed and placed in chart    LOC #[id number]

## 2020-07-17 NOTE — DISCHARGE PLANNING
Anticipated Discharge Disposition: SNF    Action: Met with pt at bedside to discuss recommendation for SNF. Pt agreeable to placement and consented to blanket referral.   Choice form completed and faxed to [facility]    Barriers to Discharge: SNF acceptance    Plan: F/U with SNF

## 2020-07-17 NOTE — DISCHARGE INSTRUCTIONS
Discharge Instructions    Discharged to other by medical transportation with self. Discharged via wheelchair, hospital escort: Yes.  Special equipment needed: Not Applicable    Be sure to schedule a follow-up appointment with your primary care doctor or any specialists as instructed.     Discharge Plan:   Diet Plan: Discussed  Activity Level: Discussed  Confirmed Follow up Appointment: Appointment Scheduled  Confirmed Symptoms Management: Discussed  Medication Reconciliation Updated: Yes    I understand that a diet low in cholesterol, fat, and sodium is recommended for good health. Unless I have been given specific instructions below for another diet, I accept this instruction as my diet prescription.   Other diet: regular    Special Instructions: Discharge instructions for the Orthopedic Patient    Follow up with Primary Care Physician within 2 weeks of discharge to home, regarding:  Review of medications and diagnostic testing.  Surveillance for medical complications.  Workup and treatment of osteoporosis, if appropriate.     -Is this a Hip/Knee/Shoulder Joint Replacement patient? Yes   TOTAL HIP REPLACEMENT, AFTER-CARE GUIDELINES     These instructions provide you with information on caring for yourself and your hip after surgery. Your health care provider may also give you instructions that are more specific. Your treatment was planned and performed according to current medical practices but problems sometimes occur. Call your health care provider if you have any problems or questions.     WHAT TO EXPECT AFTER THE PROCEDURE   After your procedure, your hip will typically be stiff, sore, and bruised. This will improve over time.     Pain   · Follow your home pain management plan as discussed with your nurse and as directed by your provider.   · It is important to follow any scheduled pain medications for maximal pain relief.   · If prescribed opioid medication, the goal is to use opioids only as needed and to  wean off prescription pain medicine as soon as possible.   · Ice use for pain control.  · Put ice in a plastic bag.   · Place a towel between your skin and the bag.   · Leave the ice on for 20 minutes, 2-3 times a day at a minimum.   · Most patients are off the pain pills by 3 weeks. If your pain continues to be severe, follow up with your provider.     Infection   Deep hip joint infections that require removal of the prostheses occur in less than 1.0% of patients. Lesser infections in the skin (cellulites) are more common and much more easily treated.   · Keep the incision as clean and dry as possible.   · Always wash your hands before touching your incision.   · Avoid dental care for 3 months after surgery. Your provider may recommend taking a dose of antibiotics an hour prior to any dental procedure. After 2 years, most providers recommend antibiotics only before an extensive procedure. Ask your provider what they recommend.   · Signs and symptoms of infection include low-grade fever, redness, pain, swelling and drainage from your incision. Notify your provider IMMEDIATELY if you develop ANY of these symptoms.     Post op Disturbances   · Bowel Habits - constipation is extremely common and caused by a combination of anesthesia, lack of mobility, dehydration and pain medicine. Use stool softeners or laxatives if necessary. It is important not to ignore this problem as bowel obstructions can be a serious complication after joint replacement surgery.   · Mood/Energy Level - Many patients experience a lack of energy and endurance for up to 2-3 months after surgery. Some people feel down and can even become depressed. This is likely due to postoperative anemia, change in activity level, lack of sleep, pain medicine and just the emotional reaction to the surgery itself that is a big disruption in a person’s life. This usually passes. If symptoms persist, follow up with your primary care provider.   · Returning to Work  - Your provider will give you specific instructions based on your profession. Generally, if you work a sedentary job requiring little standing or walking, most patients may return within 2-6 weeks. Manual labor jobs involving walking, lifting and standing may take 3-4 months. Your provider’s office can provide a release to part-time or light duty work early on in your recovery and progress you to full duty as able.   · Driving - You can begin driving once cleared by your provider, provided you are no longer taking narcotic pain medication or any other medications that impair driving. Discuss the length of time with your doctor as returning to driving will depend on things such as your vehicle, which hip was replaced (right or left) and if you do or do not have post-operative movement precautions.   · Avoiding falls - A fall during the first few weeks after surgery can damage your new hip and may result in a need for further surgery.  throw rugs and tack down loose carpeting. Be aware of floor hazards such as pets, small objects or uneven surfaces. Notify your provider of any falls.   · [employer] Metal Detectors - The sensitivity of metal detectors varies and it is likely that your prosthesis will cause an alarm. Inform the  of your artificial joint.     Diet   · Resume your normal diet as tolerated.   · It is important to achieve a healthy nutritional status by eating a well-balanced diet on a regular basis.   · Your provider may recommend that you take iron and vitamin supplements.   · Continue to drink plenty of fluids.     Shower/Bathing   · You may shower as soon as you get home from the hospital unless otherwise instructed.   · Keep your incision out of water to prevent infection. To keep the incision dry when showering, cover it with a plastic bag or plastic wrap. If your bandage is waterproof, this may not be necessary.   · Pat incision dry if it gets wet. Do not rub. Notify your  provider.   · Do not submerge in a bath until cleared by your provider. Your staples must be out and the incision completely healed.     Dressing Changes: Only change your dressing if directed by your provider.   · Wash hands.   · Open all dressing change materials.   · Remove old dressing and discard.   · Inspect incision for signs of irritation or infection including redness, increase in clear drainage, yellow/green drainage, odor and surrounding skin hot to touch. Notify your provider if present.   ·  the new dressing by one corner and lay over the incision. Be careful not to touch the inside of the dressing that will lay over the incision.   · Secure in place as instructed.     Swelling/Bruising   · Swelling is normal after hip replacement and can involve the thigh, knee, calf and foot.   · Swelling can last from 3-6 months.   · To reduce swelling, elevate your leg higher than your heart while reclining. The first week you are home you should elevate your leg an equal amount of time as you are active.   · The swelling is usually worse after you go home since you are upright for longer periods of time.   · Bruising often does not appear until after you arrive home and can be quite dramatic- appearing purple, black, or green. Bruising is typically not concerning and will subside without any treatment.     Blood Clot Prevention   Your treatment plan includes multiple preventative measures to decrease the risk of blood clots in the legs (DVTs) and the less common, but serious, clots that travel to the lungs (pulmonary emboli). Most patients are at standard risk for them, but people who are at higher risk include those who have had previous clots, a family history of clotting, smoking, diabetes, obesity, advanced age, use estrogen and/or live a sedentary lifestyle.     · Signs of blood clots in legs include - Swelling in thigh, calf or ankle that does not go down with elevation. Pain, heat and tenderness in  calf, back of calf or groin area. NOTE: blood clots can occur in either leg.   · Signs of blood clots in lungs include - Sudden increased shortness of breath, sudden onset of chest pain, and localized chest pain with coughing.   · If you experience any of the above symptoms, notify your provider and seek medical attention immediately.   · You received anticoagulant therapy (blood thinners) in the hospital. Continue the prescribed blood-thinning medication at home, as directed by your provider.   · Your risk for developing a clot continues for up to 2-3 months after surgery. Avoid prolonged sitting and dehydration (long air trips and car trips). If you do take a trip during this time, please get up, move around every 1-1.5 hours, and discuss all travel plans with your provider.     Activity   Once you get home, stay active. The key is not to overdo it. While you can expect some good days and some bad days, you should notice a gradual improvement over time and a gradual increase in endurance over the next 6 to 12 months.     · Weight Bearing - You can begin to bear weight as tolerated unless otherwise directed by your provider. Use a walker, crutches or cane to assist with walking until you can walk smoothly (minimal or no limp) without assistance.   · Physical Precautions - To assure proper recovery and tissue healing, you may be asked to take special precautions when moving (sitting, bending or sleeping) - usually for the first 6 weeks after surgery. Precautions vary from patient to patient depending on surgical approach used by your provider. Follow any specific precautions provided by your provider and physical therapist until cleared by your provider.   · Sitting - Early on it is easier to get up from a tall chair or a chair with arms. The physical therapist will show you how to sit and stand from a chair keeping your affected leg out in front of you. Get up and move around on a regular basis--at least once every  hour.   · Walking - Walk as much as you like once your doctor gives permission to proceed, but remember that walking is no substitute for your prescribed exercises.  · Follow the home exercise program prescribed during your hospital stay as directed by your physical therapist or provider.   · Continued physical therapy may be prescribed after hospital discharge to strengthen your muscles and improve your gait/walking pattern. The decision to have therapy or not after the hospital stay is made by you and your provider prior to surgery or at your follow up appointment.   · Swimming is an excellent low impact activity; you can begin as soon as the wound is healed and your provider clears you. Using a pair of training fins may make swimming a more enjoyable and effective exercise.   · Sexual activity - Your provider can tell you when it is safe to resume sexual activity.   · Other activities - Low impact activities are preferred. If you have specific questions, consult your provider.     When to Call the Doctor   Call the provider if you experience:   · Fever over 100.5° F   · Increased pain, drainage, redness, odor or heat around the incision area   · Shaking chills   · Increased knee pain with activity and rest   · Increased pain in calf, tenderness or redness above or below the knee   · Increased swelling of calf, ankle, foot   · Sudden increased shortness of breath, sudden onset of chest pain, localized chest pain with coughing   · Incision opening   Or, if there are any questions or concerns about medications or care.     Infection statistic resource:   [email or web address]-[email or web address]-[email or web address]     -Is this patient being discharged with medication to prevent blood clots?  Yes, Aspirin Aspirin, ASA oral tablets  What is this medicine?  ASPIRIN (AS pir in) is a pain reliever. It is used to treat mild pain and fever. This medicine is  also used as directed by a doctor to prevent and to treat heart attacks, to prevent strokes and blood clots, and to treat arthritis or inflammation.  This medicine may be used for other purposes; ask your health care provider or pharmacist if you have questions.  COMMON BRAND NAME(S): Aspir-Low, Aspir-Etta, Aspirtab, Shaista Advanced Aspirin, Shaista Aspirin, Shaista Aspirin Extra Strength, Shaista Aspirin Plus, Shaista Extra Strength, Shaista Extra Strength Plus, Shaista Genuine Aspirin, Shaista Womens Aspirin, Bufferin, Bufferin Extra Strength, Bufferin Low Dose  What should I tell my health care provider before I take this medicine?  They need to know if you have any of these conditions:  · anemia  · asthma  · bleeding problems  · child with chickenpox, the flu, or other viral infection  · diabetes  · gout  · if you frequently drink alcohol containing drinks  · kidney disease  · liver disease  · low level of vitamin K  · lupus  · smoke tobacco  · stomach ulcers or other problems  · an unusual or allergic reaction to aspirin, tartrazine dye, other medicines, dyes, or preservatives  · pregnant or trying to get pregnant  · breast-feeding  How should I use this medicine?  Take this medicine by mouth with a glass of water. Follow the directions on the package or prescription label. You can take this medicine with or without food. If it upsets your stomach, take it with food. Do not take your medicine more often than directed.  Talk to your pediatrician regarding the use of this medicine in children. While this drug may be prescribed for children as young as 12 years of age for selected conditions, precautions do apply. Children and teenagers should not use this medicine to treat chicken pox or flu symptoms unless directed by a doctor.  Patients over 65 years old may have a stronger reaction and need a smaller dose.  Overdosage: If you think you have taken too much of this medicine contact a poison control center or emergency room at  once.  NOTE: This medicine is only for you. Do not share this medicine with others.  What if I miss a dose?  If you are taking this medicine on a regular schedule and miss a dose, take it as soon as you can. If it is almost time for your next dose, take only that dose. Do not take double or extra doses.  What may interact with this medicine?  Do not take this medicine with any of the following medications:  · cidofovir  · ketorolac  · probenecid  This medicine may also interact with the following medications:  · alcohol  · alendronate  · bismuth subsalicylate  · flavocoxid  · herbal supplements like feverfew, garlic, sofiya, ginkgo biloba, horse chestnut  · medicines for diabetes or glaucoma like acetazolamide, methazolamide  · medicines for gout  · medicines that treat or prevent blood clots like enoxaparin, heparin, ticlopidine, warfarin  · other aspirin and aspirin-like medicines  · NSAIDs, medicines for pain and inflammation, like ibuprofen or naproxen  · pemetrexed  · sulfinpyrazone  · varicella live vaccine  This list may not describe all possible interactions. Give your health care provider a list of all the medicines, herbs, non-prescription drugs, or dietary supplements you use. Also tell them if you smoke, drink alcohol, or use illegal drugs. Some items may interact with your medicine.  What should I watch for while using this medicine?  If you are treating yourself for pain, tell your doctor or health care professional if the pain lasts more than 10 days, if it gets worse, or if there is a new or different kind of pain. Tell your doctor if you see redness or swelling. Also, check with your doctor if you have a fever that lasts for more than 3 days. Only take this medicine to prevent heart attacks or blood clotting if prescribed by your doctor or health care professional.  Do not take aspirin or aspirin-like medicines with this medicine. Too much aspirin can be dangerous. Always read the labels  carefully.  This medicine can irritate your stomach or cause bleeding problems. Do not smoke cigarettes or drink alcohol while taking this medicine. Do not lie down for 30 minutes after taking this medicine to prevent irritation to your throat.  If you are scheduled for any medical or dental procedure, tell your healthcare provider that you are taking this medicine. You may need to stop taking this medicine before the procedure.  This medicine may be used to treat migraines. If you take migraine medicines for 10 or more days a month, your migraines may get worse. Keep a diary of headache days and medicine use. Contact your healthcare professional if your migraine attacks occur more frequently.  What side effects may I notice from receiving this medicine?  Side effects that you should report to your doctor or health care professional as soon as possible:  · allergic reactions like skin rash, itching or hives, swelling of the face, lips, or tongue  · breathing problems  · changes in hearing, ringing in the ears  · confusion  · general ill feeling or flu-like symptoms  · pain on swallowing  · redness, blistering, peeling or loosening of the skin, including inside the mouth or nose  · signs and symptoms of bleeding such as bloody or black, tarry stools; red or dark-brown urine; spitting up blood or brown material that looks like coffee grounds; red spots on the skin; unusual bruising or bleeding from the eye, gums, or nose  · trouble passing urine or change in the amount of urine  · unusually weak or tired  · yellowing of the eyes or skin  Side effects that usually do not require medical attention (report to your doctor or health care professional if they continue or are bothersome):  · diarrhea or constipation  · headache  · nausea, vomiting  · stomach gas, heartburn  This list may not describe all possible side effects. Call your doctor for medical advice about side effects. You may report side effects to FDA at  1-800-FDA-1088.  Where should I keep my medicine?  Keep out of the reach of children.  Store at room temperature between 15 and 30 degrees C (59 and 86 degrees F). Protect from heat and moisture. Do not use this medicine if it has a strong vinegar smell. Throw away any unused medicine after the expiration date.  NOTE: This sheet is a summary. It may not cover all possible information. If you have questions about this medicine, talk to your doctor, pharmacist, or health care provider.  © 2020 [employer] (2018-01-30 10:42:13)      · Is patient discharged on Warfarin / Coumadin?   No     Depression / Suicide Risk    As [facility] are [facility], it is important to learn how to keep safe from harming yourself.    Recognize the warning signs:  · Abrupt changes in personality, positive or negative- including increase in energy   · Giving away possessions  · Change in eating patterns- significant weight changes-  positive or negative  · Change in sleeping patterns- unable to sleep or sleeping all the time   · Unwillingness or inability to communicate  · Depression  · Unusual sadness, discouragement and loneliness  · Talk of wanting to die  · Neglect of personal appearance   · Rebelliousness- reckless behavior  · Withdrawal from people/activities they love  · Confusion- inability to concentrate     If you or a loved one observes any of these behaviors or has concerns about self-harm, here's what you can do:  · Talk about it- your feelings and reasons for harming yourself  · Remove any means that you might use to hurt yourself (examples: pills, rope, extension cords, firearm)  · Get professional help from the community (Mental Health, Substance Abuse, psychological counseling)  · Do not be alone:Call your Safe Contact- someone whom you trust who will be there for you.  · Call your local CRISIS HOTLINE 876-8156 or 712-861-5439  · Call your local Children's Mobile Crisis Response Team  [facility] (946) 598-5492 or www.[place].[employer]  · Call the toll free National Suicide Prevention Hotlines   · National Suicide Prevention Lifeline 328-280-CUDX (8205)  · National Hope Line Network 800-SUICIDE (580-5811)

## 2020-07-17 NOTE — DISCHARGE SUMMARY
DATE OF ADMISSION:  07/15/2020    DATE OF DISCHARGE:  07/18/2020    PROCEDURE:  Left total hip arthroplasty.    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  She has failed conservative   management and presents for operative treatment.  For further details of H and   P, please see chart.    HOSPITAL COURSE:  Patient was admitted and taken to the operating room for the   above-named procedure.  She tolerated the procedure well.  She was returned   to the floor.  She was advanced from a clear liquid diet to a general diet   without difficulty.  She was advanced from IV pain meds to p.o. pain meds with   good pain control.  The patient was ambulating fairly well; however, she   lives in a second floor apartment with 12 steps to get up to it and lives   alone and does not have assistance.  Therefore, she was felt necessary to be   transferred to a skilled nursing facility.  Patient will be transferred as   soon as a bed is available.    DISCHARGE MEDICATIONS:  1.  Tylenol 1000 mg p.o. every 6 hours.  2.  Tramadol 50 mg p.o. every 6 hours.  3.  Oxycodone 5-10 mg p.o. every 6 hours p.r.n. pain.  4.  All prehospital medications as discussed and evaluated on the medical   reconciliation form.    DISCHARGE INSTRUCTIONS:  The patient will be discharged to skilled nursing   facility.  She is to follow up in my office in 10 days' time.  She is to have   strict posterior hip precautions.  She is to be weightbearing as tolerated.    She is to maintain her silver dressing on her hip until she returns to the   office in 2 weeks.  She has been instructed to call my office for fever,   redness, chills, drainage, or any other problems.       ____________________________________     MD ROCHELLE John / REYNALDO    DD:  07/17/2020 07:44:31  DT:  07/17/2020 07:53:51    D#:  [id number]  Job#:  [id number]

## 2020-07-17 NOTE — THERAPY
Occupational Therapy   Initial Evaluation     Patient Name: Generosa Apalla Chua  Age:  70 y.o., Sex:  female  Medical Record #: [id number]  Today's Date: 7/17/2020     Precautions:  Posterior Hip Precautions, Weight Bearing As Tolerated Left Lower Extremity    Assessment  Patient is 70 y.o. female with a diagnosis of L JACQUELINE. A&Ox4. Recalls 2/3 hip prec's; reviewed/demo'd. Performs most self-cares with Sup. AE training completed. Walks ~60' with Sup, FWW; steady. Pt will benefit from continued OT services at snf; lives alone in 2nd floor apt.      Plan  Recommend Occupational Therapy for Evaluation only.  Discharge recommendations:  Recommend post-acute placement for additional occupational therapy services prior to discharge home.     07/17/20 1101   Prior Living Situation   Prior Services None   Housing / Facility 1 Story Apartment / Condo   Steps Into Home 15   Steps In Home 0   Rail Both Rail (Steps into Home)   Elevator No   Bathroom Set up Bathtub / Shower Combination;Shower Chair;Grab Bars   Equipment Owned Front-Wheel Walker;Quad Cane;Tub / Shower Seat;Grab Bar(s) In Tub / Shower   Prior Level of ADL Function   Self Feeding Independent   Grooming / Hygiene Independent   Bathing Independent   Dressing Independent   Toileting Independent   Prior Level of IADL Function   Medication Management Independent   Laundry Independent   Kitchen Mobility Independent   Finances Independent   Home Management Independent   Shopping Independent   Prior Level Of Mobility Independent Without Device in Home   Driving / Transportation Unable To Determine At This Time   Occupation (Pre-Hospital Vocational) Retired Due To Age   Leisure Interests Television   Balance Assessment   Sitting Balance (Static) Good   Sitting Balance (Dynamic) Good   Standing Balance (Static) Fair +   Standing Balance (Dynamic) Fair +   Weight Shift Sitting Good   Weight Shift Standing Good   Bed Mobility    Supine to Sit Modified Independent   ADL Assessment    Eating Independent   Grooming Supervision   Upper Body Dressing Independent   Lower Body Dressing Supervision   Toileting Supervision   Comments uses reacher   Functional Mobility   Sit to Stand Supervised   Bed, Chair, Wheelchair Transfer Supervised   Toilet Transfers Supervised   Interdisciplinary Plan of Care Collaboration   Collaboration Comments 1x only eval results. [facility]. PT to transfer to snf today.

## 2020-07-17 NOTE — CARE PLAN
Problem: Safety  Goal: Will remain free from injury  Outcome: PROGRESSING AS EXPECTED  Goal: Will remain free from falls  Outcome: PROGRESSING AS EXPECTED     Problem: Infection  Goal: Will remain free from infection  Outcome: PROGRESSING AS EXPECTED     Problem: Venous Thromboembolism (VTW)/Deep Vein Thrombosis (DVT) Prevention:  Goal: Patient will participate in Venous Thrombosis (VTE)/Deep Vein Thrombosis (DVT)Prevention Measures  Outcome: PROGRESSING AS EXPECTED     Problem: Knowledge Deficit  Goal: Knowledge of disease process/condition, treatment plan, diagnostic tests, and medications will improve  Outcome: PROGRESSING AS EXPECTED  Goal: Knowledge of the prescribed therapeutic regimen will improve  Outcome: PROGRESSING AS EXPECTED

## 2020-07-17 NOTE — PROGRESS NOTES
Patient with minimal pain  AVSS  Exam unchanged  Dressing c/d/i    S/p lt JACQUELINE POD 2  Doing well  Awaiting placement

## 2020-07-17 NOTE — PROGRESS NOTES
Pt discharged per MD orders.   Pt meeting discharge criteria.   VSS.   Pain controlled.   IV removed.   Discharge instructions and prescriptions reviewed with pt. Pt verbalized understanding.  Pt left with [facility] to [facility].   Tried to call report to RN at [facility], no answer.

## 2020-07-17 NOTE — PROGRESS NOTES
Received report from day shift nurse.   Assumed pt care  Pt is A&Ox4, resting comfortably in chair.   Pt on r.a  No signs of SOB/respiratory distress.   Pt denied any pain at this moment  Assessment completed, Labs noted, VSS.   Surgical dressing to L hip in place CDI, +CMS, + pulse, able to wiggle toes and dorsi/plantar flex; denied any numbness/tingling.   Polar ice & SCDs on.    Educated pt the importance to call for assistance.  Fall precautions in place.   Bed locked & at lowest position. Bed alarm on   Call light and personal belongings within reach.   Continue to monitor.

## 2020-07-17 NOTE — PROGRESS NOTES
Spiritual Care Note    Patient Information     Patient's Name: Generosa Apalla Chua   MRN: [id number]    YOB: 1950   Age and Gender: 69 y.o. female   Service Area: GEN SURGERY [facility]   Room (and Bed): [facility]/   Ethnicity or Nationality: [place]    Primary Language: English   Confucianist/Spiritual preference: Nothing in Particular   Place of Residence: [place]   Family/Friends/Others Present: no   Clinical Team Present: no   Medical Diagnosis(-es)/Procedure(s): ARTHROPLASTY, HIP, TOTAL      Code Status: Full Code    Date of Admission: 7/15/2020   Length of Stay: 1 days        Spiritual Care Provider Information:  Name of Spiritual Care Provider: Giulia Rosen  Title of Spiritual Care Provider: Associate   Phone Number: [phone]  E-mail: [email or web address]  Total time : 45 minutes    Spiritual Screen Results:    Gen Nursing  Spiritual Screen  Is your spiritual health or inner well-being important to you as you cope with your medical condition?: Yes  Would you like to receive a visit from our Spiritual Care team or your own Christianity or spiritual leader?: Yes  Was spiritual care education provided to the patient?: Yes  Cultural/Spiritual Needs During Care: Ceremonial  Ceremonial Considerations: Prayer     Palliative Care  PC Confucianist/Spiritual Screening  Was spiritual care education provided to the patient?: Yes      Encounter/Request Information  Encounter/Request Type   Visited With: Patient, Health care provider  Nature of the Visit: Initial, On shift  Continue Visiting: (upon request)  General Visit: Yes  Surgical Visit: Post-op  Referral From/ Origin of Request: Epic nursing    Religous Needs/Values       Spiritual Assessment   Spiritual Care Encounters    Observations/Symptoms: Accepting, Thankfulness(Pt lying in bed, alert)    Interacton/Conversation:  introduced self to pt. Pt was very pleasant to talk with, welcomed  visit. Pt was very talkative, shared  freely of her medical story, her family life, how she cared for her mother in [facility] until her passing 3 years. Pt is hoping to go to a facility tomorrow. Pt stated she has no cell phone. Her brother and nephews come to visit her. There is only one car in the family. She has concern about getting up and down the stairs of her apt. building as there is no elevator and she lives on an upper floor. Pt seemed to have a genuine affect, and smiled often. Pt was grateful for 's visit.    Assessment: Need    Need: Seeking Spiritual Assistance and Support    Interventions: Companionship, Conversation    Outcomes: Coping, Ability to Communicate with Truth and Honesty, Acceptance, Value/Dignity/Respect    Plan: Visit Upon Request

## 2020-07-20 NOTE — DISCHARGE PLANNING
Late entry for 7/16/20    Discussed case with Dr. Camacho in regards to patient not meeting inpatient criteria.  Code 44 completed and patient was rolled back to observation LOC.  Code 44 paperwork from MD and patient signed and scanned into the media tab.

## 2020-07-22 NOTE — DISCHARGE SUMMARY
DATE OF ADMISSION:  07/15/2020    DATE OF DISCHARGE:  07/17/2020    PROCEDURE:  Left total hip arthroplasty.    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  She has failed conservative   management and presents for operative treatment.  For further details of H and   P, please see chart.    HOSPITAL COURSE:  Patient was admitted and taken to the operating room for the   above-named procedure.  She tolerated the procedure well.  She was returned   to the floor.  She was advanced from a clear liquid diet to a general diet   without difficulty.  She was advanced from IV pain meds to p.o. pain meds with   good pain control.  The patient was ambulating fairly well; however, she   lives in a second floor apartment with 12 steps to get up to it and lives   alone and does not have assistance.  Therefore, she was felt necessary to be   transferred to a skilled nursing facility.  Patient will be transferred as   soon as a bed is available.    DISCHARGE MEDICATIONS:  1. Tylenol 1000 mg p.o. every 6 hours.  2. Tramadol 50 mg p.o. every 6 hours.  3. Oxycodone 5-10 mg p.o. every 6 hours p.r.n. pain.  4. All prehospital medications as discussed and evaluated on the medical   reconciliation form.    DISCHARGE INSTRUCTIONS:  The patient will be discharged to skilled nursing   facility.  She is to follow up in my office in 10 days' time.  She is to have   strict posterior hip precautions.  She is to be weightbearing as tolerated.    She is to maintain her silver dressing on her hip until she returns to the   office in 2 weeks.  She has been instructed to call my office for fever,   redness, chills, drainage, or any other problems.             ____________________________________     MD ROCHELLE John / REYNALDO    DD:  07/17/2020 07:44:31  DT:  07/17/2020 07:53:51    D#:  [id number]  Job#:  [id number]

## 2021-01-15 DIAGNOSIS — Z23 NEED FOR VACCINATION: ICD-10-CM

## 2024-11-23 NOTE — OR NURSING
.Best Practices Inpatient Care Hospitalist Progress Note      Date Of Service: 11/23/2024    Reason for F/U: DVT and PE    Subjective:  Patient is seen and examined.   Feels better today and right leg is less swollen.  Denies chest pain or shortness of breath.      ROS:   Negative for all 10 systems except as per subjective      II/O's    Intake/Output Summary (Last 24 hours) at 11/23/2024 1711  Last data filed at 11/23/2024 1308  Gross per 24 hour   Intake 931.24 ml   Output 1125 ml   Net -193.76 ml          Hospital Meds  Current Facility-Administered Medications   Medication Dose Route Frequency Provider Last Rate Last Admin    WARFARIN - PHYSICIAN MONITORED 1 each  1 each Does not apply Q Evening Sheba Bobby MD        sodium chloride 0.9 % injection 10 mL  10 mL Intravenous PRN Alyssia Stella E, CNP        sodium chloride 0.9 % injection 2 mL  2 mL Intracatheter 2 times per day Stella Cade E, CNP   2 mL at 11/22/24 2123    sodium chloride (NORMAL SALINE) 0.9 % bolus 500 mL  500 mL Intravenous PRN Alyssia Stella E, CNP        acetaminophen (TYLENOL) tablet 650 mg  650 mg Oral Q4H PRN Alyssia Stella E, CNP   650 mg at 11/22/24 2258    Or    acetaminophen (TYLENOL) suppository 650 mg  650 mg Rectal Q4H PRN Alyssia Stella E, CNP        docusate sodium-sennosides (SENOKOT S) 50-8.6 MG 2 tablet  2 tablet Oral BID PRN Alyssia Stella E, CNP        dextrose 50 % injection 25 g  25 g Intravenous PRN Alyssia Stella E, CNP        dextrose 50 % injection 12.5 g  12.5 g Intravenous PRN Alyssia Stella E, CNP        insulin lispro (ADMELOG,HumaLOG) - Correction Dose   Subcutaneous 4x Daily AC & HS Sheba Bobby MD   6 Units at 11/23/24 1318    hydrALAZINE (APRESOLINE) tablet 25 mg  25 mg Oral Q4H PRN Ector Danielle MD   25 mg at 11/22/24 0630    insulin glargine (LANTUS) injection 18 Units  18 Units Subcutaneous Nightly Sheba Bobby MD   18 Units at 11/22/24 2122    heparin (porcine) 25,000 units/250 mL in  Pt allergies and NPO status verified. Home medications reconciled. Belongings secured. Pt verbalizes understanding of pain scale, expected course of stay and plan of care. Surgical site verified with pt. IV access established. Triple AIM completed. All questions answered. Bed in low position. Call light in reach.   dextrose 5 % infusion  1-40 Units/kg/hr (Order-Specific) Intravenous Continuous Po Israel MD 19.8 mL/hr at 11/23/24 1308 19 Units/kg/hr at 11/23/24 1308    heparin (porcine) injection 8,300 Units  80 Units/kg (Order-Specific) Intravenous PRN Po Israel MD   8,300 Units at 11/22/24 1541    heparin (porcine) injection 4,100 Units  40 Units/kg (Order-Specific) Intravenous PRN Po Israel MD        dextrose 50 % injection 25 g  25 g Intravenous PRN Okorie, Stella E, CNP        dextrose 50 % injection 12.5 g  12.5 g Intravenous PRN Okorie, Stella E, CNP        glucagon (GLUCAGEN) injection 1 mg  1 mg Intramuscular PRN Okorie, Stella E, CNP        dextrose (GLUTOSE) 40 % gel 15 g  15 g Oral PRN Okorie, Stella E, CNP        dextrose (GLUTOSE) 40 % gel 30 g  30 g Oral PRN Okorie, Stella E, CNP            Last Recorded Vitals  Temp:  [97.5 °F (36.4 °C)-98.1 °F (36.7 °C)] 97.9 °F (36.6 °C)  Heart Rate:  [] 102  Resp:  [16-18] 16  BP: (133-155)/(80-92) 155/89       SpO2 Readings from Last 3 Encounters:   11/23/24 95%   01/18/24 98%   03/17/22 97%        Physical Exam    General: Alert in no acute distress   Head and Neck: Normocephalic, atraumatic. Conjunctivae clear,  hearing is normal.   Neck supple.   Respiratory: Lungs clear to auscultation bilaterally, no wheezing. Normal respiratory effort.   Cardiovascular: Regular rate and rhythm, no murmurs, rubs, or gallops  Gastrointestinal: Abdomen is soft nontender, nondistended,  no palpable masses. BS normal  Neuro: Cranial nerves grossly intact,  no focal deficit. Speech is normal. Moves all extremities.   Skin: No rash, no jaundice.  Extremities: No swelling, No calf tenderness.  Psych: Appropriate mood and affect.     Labs     Recent Labs     11/21/24  2110 11/22/24  0000 11/22/24  0018 11/22/24  0600 11/22/24  0806 11/23/24  1036 11/23/24  1227 11/23/24  1403 11/23/24  1708   WBC 8.0 8.5  --  8.8  --  5.9  --   --   --    HCT 34.5*  33.1*  --  35.5*  --  31.6*  --   --   --    HGB 11.0* 10.7*  --  11.3*  --  10.3*  --   --   --     195  --  210  --  204  --   --   --    SODIUM 137  --   --  137  --  135  --   --   --    POTASSIUM 4.4  --   --  3.7  --  4.4  --   --   --    CHLORIDE 106  --   --  108  --  107  --   --   --    CO2 24  --   --  22  --  23  --   --   --    GLUCOSE 336*  --   --  255*  --  386*  --   --   --    BUN 26*  --   --  19  --  20  --   --   --    CREATININE 1.10  --   --  0.93  --  0.91  --   --   --    CALCIUM 8.8  --   --  8.8  --  8.6  --   --   --    ALBUMIN 3.2*  --   --  3.1*  --   --   --   --   --    MG  --   --   --   --   --  1.9  --   --   --    BILIRUBIN 0.5  --   --  0.6  --   --   --   --   --    ALKPT 90  --   --  84  --   --   --   --   --    AST 9  --   --  <8  --   --   --   --   --    GPT 16  --   --  11  --   --   --   --   --    GLUB  --   --    < >  --    < >  --  262* 267* 349*    < > = values in this interval not displayed.        Imaging    CT ABDOMEN PELVIS W CONTRAST   Final Result   1. Focal wall thickening of the distal transverse colon. Findings are   nonspecific on CT as opposed to colonoscopy.   2. Mild right renal pelvocaliectasis, nonspecific. Mild fatty liver   infiltration.   3. No acute abnormality in the background of the abdomen/pelvis. Right   lower lobe lung nodule redemonstrated.   4. Additional background chronic appearing findings as described above.         Electronically Signed by: EDIL OSUNA M.D.    Signed on: 11/22/2024 7:40 PM    Workstation ID: [id number]      CTA CHEST PULMONARY EMBOLISM   Final Result      1.   Bilateral main pulmonary artery emboli with a nonocclusive saddle   component as demonstrated on image 105 of series 3 extending from the left   main pulmonary artery to the proximal right main pulmonary artery. RV LV   ratio is not dilated to suggest víctor heart strain.   2.   1.0 cm nodule within the posterior segment of the left lower lung.       Emergent findings were discussed with Dr. Mccoy at the time of this   dictation.      Electronically Signed by: CARMEN CLIFTON MD    Signed on: 11/21/2024 11:06 PM    Workstation ID: [id number]       VAS LOWER EXTREMITY VENOUS DUPLEX RIGHT   Final Result   1.   Acute deep vein thrombosis of the right lower extremity, from the mid   femoral vein through the calf veins, described above..         Findings of the acute deep venous thrombosis were conveyed via telephone by   Dr. Brar (Radiologist) and acknowledged by Dr. Po Israel at   2013 on 11/21/2024.         Electronically Signed by: DOMENICA BRAR M.D.    Signed on: 11/21/2024 8:15 PM    Workstation ID: [id number]      XR TIBIA FIBULA 2 VIEWS RIGHT   Final Result   No acute osseous abnormality of the right tibia-fibula.      Electronically Signed by: DOMENICA BRAR M.D.    Signed on: 11/21/2024 7:33 PM    Workstation ID: [id number]          Cultures  Microbiology Results       None             Assessment & Plan     #Right femoral DVT, acute  # Previous history of DVT 2 years ago  XR TIBIA FIBULA 2 VIEWS RIGHT - No acute osseous abnormality of the right tibia-fibula.  [facility] LOWER EXTREMITY VENOUS DUPLEX RIGHT - Acute deep vein thrombosis of the right lower extremity, from the mid femoral vein through the calf veins, described above.             EKG - Sinus tachycardia. Cannot rule out. Inferior infarct, age undetermined  Abnormal ECG.No previous ECGs available.  Rate 103, QTc 463  INR 1.0             On Heparin gtt  Start Coumadin tonight with goal INR 2-3  Hematology consult appreciated, d/w Dr Grewal              consult for resources-patient does not have insurance currently and unable to afford Eliquis.  Will need to start taking Coumadin.  Will arrange clinic for follow-up.             Continue neurovascular checks             Monitor              Monitor labs     Bilateral  pulmonary embolism  CTA chest - Bilateral  main pulmonary artery emboli with a nonocclusive saddle  component as demonstrated on image 105 of series 3 extending from the left main pulmonary artery to the proximal right main pulmonary artery. RV LV ratio is not dilated to suggest víctor heart strain.             EKG as above             Continue heparin drip and start Coumadin tonight             Discussed with hematology             Monitor on telemetry            echocardiogram             Monitor O2 sats  Consulted cardiology service for possible thrombectomy-evaluated, no RV strain and no indication for EKOS.     Lung nodule  CT Chest -  1.0 cm nodule within the posterior segment of the left lower lung.             Monitor   CT abdomen and pelvis showed Focal wall thickening of the distal transverse colon.   Patient will need colonoscopy for further evaluation     Hypoalbuminemia             Albumin 3.2, encourage high-protein diet     Anemia, normocytic, mild             Hemoglobin 11.0             Unknown recent baseline    Diabetes type 2, uncontrolled with hyperglycemia             Blood glucose on arrival 336             Patient not on any prescription medication at home             Started on Lantus 10 units, increase to 18 units tonight        A1c is 12.5%             Monitor on sliding scale-increase dose for sliding scale               Nutrition: Regular, diabetic  DVT Ppx: Heparin drip  Dispo: Home  SARA: When INR therapeutic    Code Status    Code Status: Full Resuscitation    Sheba Bobby MD  Best Practices Inpatient Care    This note was created using the Dragon voice recognition system. Errors in content may be related to improper recognition of the system. Effort to review and correct the note has been made but irregularities may still be present.     Note to the patient:  the 21st Century Cures Act makes medical notes like these available to patients in the interest of transparency. Please be advised that this is a medical document.  Medical documents are intended to carry relevant information, facts as evident, and the clinical opinion of the practitioner. The medical note is intended as peer to peer communication, and may appear blunt or direct. It is written in medical language, and may contain abbreviations or verbiage that are unfamiliar.

## (undated) DEVICE — TUBE CONNECTING SUCTION - CLEAR PLASTIC STERILE 72 IN (50EA/CA)

## (undated) DEVICE — CANISTER SUCTION RIGID RED 1500CC (40EA/CA)

## (undated) DEVICE — ELECTRODE 850 FOAM ADHESIVE - HYDROGEL RADIOTRNSPRNT (50/PK)

## (undated) DEVICE — Device

## (undated) DEVICE — TIP INTPLS HFLO ML ORFC BTRY - (12/CS)  FOR SURGILAV

## (undated) DEVICE — PILLOW ABDUCTION HIP MEDIUM - (6EA/CA)

## (undated) DEVICE — GLOVE BIOGEL PI INDICATOR SZ 7.5 SURGICAL PF LF -(50/BX 4BX/CA)

## (undated) DEVICE — LENS/HOOD FOR SPACESUIT - (32/PK) PEEL AWAY FACE

## (undated) DEVICE — BLADE SAGITTAL SYSTEM 18MM

## (undated) DEVICE — HANDPIECE 10FT INTPLS SCT PLS IRRIGATION HAND CONTROL SET (6/PK)

## (undated) DEVICE — GLOVE BIOGEL SZ 6.5 SURGICAL PF LTX (50PR/BX 4BX/CA)

## (undated) DEVICE — NEPTUNE 4 PORT MANIFOLD - (20/PK)

## (undated) DEVICE — HEAD HOLDER JUNIOR/ADULT

## (undated) DEVICE — GLOVE SURGICAL PROTEXIS PI 8.0 LF - (50PR/BX)

## (undated) DEVICE — SODIUM CHL IRRIGATION 0.9% 1000ML (12EA/CA)

## (undated) DEVICE — BLADE SAGITTAL SAW DUAL CUT 75.0 X 25.0MM (1/EA)

## (undated) DEVICE — WATER IRRIGATION STERILE 1000ML (12EA/CA)

## (undated) DEVICE — GLOVE BIOGEL SZ 8 SURGICAL PF LTX - (50PR/BX 4BX/CA)

## (undated) DEVICE — SUTURE 2-0 VICRYL PLUS CT-1 36 (36PK/BX)"

## (undated) DEVICE — GLOVE BIOGEL SZ 7.5 SURGICAL PF LTX - (50PR/BX 4BX/CA)

## (undated) DEVICE — BAG SPONGE COUNT 10.25 X 32 - BLUE (250/CA)

## (undated) DEVICE — SUCTION INSTRUMENT YANKAUER BULBOUS TIP W/O VENT (50EA/CA)

## (undated) DEVICE — HUMID-VENT HEAT AND MOISTURE EXCHANGE- (50/BX)

## (undated) DEVICE — LACTATED RINGERS INJ 1000 ML - (14EA/CA 60CA/PF)

## (undated) DEVICE — SUTURE GENERAL

## (undated) DEVICE — GOWN WARMING STANDARD FLEX - (30/CA)

## (undated) DEVICE — SODIUM CHL. IRRIGATION 0.9% 3000ML (4EA/CA 65CA/PF)

## (undated) DEVICE — GLOVE BIOGEL INDICATOR SZ 8.5 SURGICAL PF LTX - (50/BX 4BX/CA)

## (undated) DEVICE — DRESSING AQUACEL AG ADVANTAGE 3.5 X 10" (10EA/BX)"

## (undated) DEVICE — SENSOR SPO2 NEO LNCS ADHESIVE (20/BX) SEE USER NOTES

## (undated) DEVICE — STOCKINET TUBULAR 6IN STERILE - 6 X 48YDS (25/CA)

## (undated) DEVICE — PROTECTOR ULNA NERVE - (36PR/CA)

## (undated) DEVICE — PACK TOTAL HIP - (1/CA)

## (undated) DEVICE — GLOVE BIOGEL PI INDICATOR SZ 6.5 SURGICAL PF LF - (50/BX 4BX/CA)

## (undated) DEVICE — KIT ANESTHESIA W/CIRCUIT & 3/LT BAG W/FILTER (20EA/CA)

## (undated) DEVICE — STAPLER SKIN DISP - (6/BX 10BX/CA) VISISTAT

## (undated) DEVICE — CHLORAPREP 26 ML APPLICATOR - ORANGE TINT(25/CA)

## (undated) DEVICE — GLOVE BIOGEL PI ORTHO SZ 8 PF LF (40PR/BX)

## (undated) DEVICE — PILLOW ABDUCTION HIP SMALL - (6EA/CA)

## (undated) DEVICE — GLOVE, LITE (PAIR)

## (undated) DEVICE — MASK ANESTHESIA ADULT  - (100/CA)

## (undated) DEVICE — ELECTRODE DUAL RETURN W/ CORD - (50/PK)